# Patient Record
Sex: FEMALE | Race: WHITE
[De-identification: names, ages, dates, MRNs, and addresses within clinical notes are randomized per-mention and may not be internally consistent; named-entity substitution may affect disease eponyms.]

---

## 2019-02-14 PROBLEM — Z00.00 ENCOUNTER FOR PREVENTIVE HEALTH EXAMINATION: Status: ACTIVE | Noted: 2019-02-14

## 2019-03-07 ENCOUNTER — APPOINTMENT (OUTPATIENT)
Dept: ENDOCRINOLOGY | Facility: CLINIC | Age: 69
End: 2019-03-07
Payer: MEDICARE

## 2019-03-07 VITALS
BODY MASS INDEX: 31.08 KG/M2 | RESPIRATION RATE: 16 BRPM | HEART RATE: 96 BPM | WEIGHT: 198 LBS | OXYGEN SATURATION: 98 % | DIASTOLIC BLOOD PRESSURE: 80 MMHG | HEIGHT: 67 IN | SYSTOLIC BLOOD PRESSURE: 140 MMHG

## 2019-03-07 DIAGNOSIS — Z80.8 FAMILY HISTORY OF MALIGNANT NEOPLASM OF OTHER ORGANS OR SYSTEMS: ICD-10-CM

## 2019-03-07 PROCEDURE — 99214 OFFICE O/P EST MOD 30 MIN: CPT

## 2019-03-07 NOTE — HISTORY OF PRESENT ILLNESS
[FreeTextEntry1] : 67 yo female f/u for MNG/ hyperthyroidism\par feels well  on mmi 2.5mg qd . off inderal. \par no nausea on a smaller dose\par came earlier. increase cough on benicar/HCT 40/12.5\par saw Dr. Ferguson, reports nl work up. \par a1c- 6.0 <-- 6.0, TSH- 0.558 <-- 0.39, FT4- 1.03 <--  1.0, T3-138 <-- 116\par labs (4/24/18)- a1c- 6.0, TSH -0.4, FT4-0.94, T3-103, LDL-128, TC-202, 25D- 36\par \par Thyr US (11/27/18)- multiple b/l nodules, incl RLP 1.8, RMP 1.6, LMP 1.4. All described as stable\par Thyr US (5/1/18)- enlarged thyroid. multiple b/l nodulkes, incl dominant Rt inferior 1.8 complex w/ calc (prev FNA'd); LMP 2.0 complex with calc\par Thyr US (11/2/17)- multiple b/l nodules, incl Rt inferior 1.9cm complex and Lt superior 1.9 w/ calc. Appear stable\par Thyr US (5/1/17)- multiple b/l nodules, stable., incl dominant Rt inf complex 2.0cm; RMP calcified 1.0cm, LMP 1.9cm calcified\par Thyr US (9/20/16)- mutiple b/l nodules. Rt inferior 1.9cm (+ calcif), RMP calcified 1.0cm; LMP 1.8cm calcified. 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 RLP 2.0cm- AUS in a background of lymph thyroiditis (Katia III). \par \par ***Thyroseq- no mutations assoc w/ high risk cancer. TSHR mutation identified (10% freq)- this mutation is assos w/ low risk ca if  freq > 30%. therefore, likely to be a benign nodule\par \par Abd US (5/1/18)- several gallbladder polyps upto 0.4cm\par Abd US- 0.4cm gallbladder polyp\par \par *** intolerant of fosamax (myalgia)\par seeing Dr. Jones (rheum)- for poss prolia vs reclast arranged by him, but is presently on hold b/o dental implant work\par \par saw neuro (dr. Langston)- apparently nl w/up\par \par DXA (5/1/17)- LS (-2.1) with L1 (-2.5), Rt FN (-2.5). \par \par *** started fosamax in 11/17.\par \par DXA (5/19/15)- FN (-1.9),  LS (-1.7), wrist- (-1.5)\par FRAX - m/op- 11%  , hip- 1.8%\par \par \par 24h I-123 U+S (3/31/16)- 24.3%; dominant "cold" region in LMLP lobe. \par \par labs (3/29/16)- TSH- 0.10, FT4- 0.78, T3- 102, + Tg/TPO ab. neg TSI/TBII\par \par Thyr US (12/10/15)- multiple b/l nodules, incl LUP iso vascular 1.6x1.2x10, RMP iso vascular 1.6x1.5x0.9; RLP hypo, avascular 1.9x1.9x1.4\par \par \par \par HPI:\par with history of Hashimoto's thyroiditis, diagnosed in 2000. Initially on synthroid (seen by Dr. De La Vega) for 1-2 years, then switched to Dr. High, and eventually stopped synthroid because of low TSH. Has been followed annually for a "mildly low TSH", but did not require any intervention.\par \par Also, was diagnosed with MNG, recalls a benign FNA of one of the nodule several years ago.\par \par No US/ FNA reports are available.\par \par Reports having a DXA scan done last year, but is not aware of results.\par \par Sister was just diagnosed with a papillary thyroid cancer. Denies history of radiation exposure to head and neck area in a childhood. \par \par \par \par Labs (3/7/16)- TSH - 0.06, FT4- 1.07, T3- 111, a1c- 5.9\par \par labs from 12/15- TSH - 0.078, FT4- 1.09, a1c- 6.0\par \par

## 2019-03-07 NOTE — ASSESSMENT
[FreeTextEntry1] : Given osteopenia and incr risk for AFib, cont mmi 2.5mg qd (R+B in details).\par - monitor neutrophils\par D/w pt poss total tx - again reviewed risks of AUS and current thyroseq analysis report. Pt declines total tx for now\par d/c benicar/HCT. prev similar SE on cozaar, lisinopril\par - procardia xl 60mg. cont HCTZ 12.5mg\par keep off fosamax. to see rheum for prolia vs reclast (R+B)- per pt, is on hold for now b/o doing dental implants\par - given nodular stability , will rpt thyr US in 11/19. prev d/w pt potential reFNA  next year given + FHX and prior AUS pathology\par - reviewed need in statins- pt declined at present\par - advised to see GI  (Dr. Rocha) for gallbladder polyp\par - rpt DXA 3 sites in 05/19\par RTC 3 mos, or sooner prn. labs prior. [Carbohydrate Consistent Diet] : carbohydrate consistent diet [Methimazole Therapy] : Risks and benefits of methimazole therapy were discussed with the patient,  including rash, liver dysfunction, and agranulocytosis.  Patient was instructed to call the office for flu-like symptoms eg fever and sore-throat

## 2019-03-13 RX ORDER — NIFEDIPINE 60 MG/1
60 TABLET, EXTENDED RELEASE ORAL DAILY
Qty: 90 | Refills: 2 | Status: DISCONTINUED | COMMUNITY
Start: 2019-03-07 | End: 2019-03-13

## 2019-03-25 ENCOUNTER — RX RENEWAL (OUTPATIENT)
Age: 69
End: 2019-03-25

## 2019-05-13 ENCOUNTER — RX RENEWAL (OUTPATIENT)
Age: 69
End: 2019-05-13

## 2019-05-15 ENCOUNTER — RX RENEWAL (OUTPATIENT)
Age: 69
End: 2019-05-15

## 2019-06-02 LAB
25(OH)D3 SERPL-MCNC: 34.8 NG/ML
ALBUMIN SERPL ELPH-MCNC: 4.4 G/DL
ALP BLD-CCNC: 56 U/L
ALT SERPL-CCNC: 21 U/L
ANION GAP SERPL CALC-SCNC: 15 MMOL/L
AST SERPL-CCNC: 22 U/L
BILIRUB SERPL-MCNC: 0.6 MG/DL
BUN SERPL-MCNC: 19 MG/DL
CALCIUM SERPL-MCNC: 9.3 MG/DL
CHLORIDE SERPL-SCNC: 105 MMOL/L
CHOLEST SERPL-MCNC: 183 MG/DL
CHOLEST/HDLC SERPL: 3 RATIO
CO2 SERPL-SCNC: 24 MMOL/L
CREAT SERPL-MCNC: 0.92 MG/DL
ESTIMATED AVERAGE GLUCOSE: 128 MG/DL
GLUCOSE SERPL-MCNC: 102 MG/DL
HBA1C MFR BLD HPLC: 6.1 %
HDLC SERPL-MCNC: 62 MG/DL
LDLC SERPL CALC-MCNC: 105 MG/DL
POTASSIUM SERPL-SCNC: 4.6 MMOL/L
PROT SERPL-MCNC: 6.7 G/DL
SODIUM SERPL-SCNC: 144 MMOL/L
T3 SERPL-MCNC: 155 NG/DL
T4 FREE SERPL-MCNC: 1.3 NG/DL
TRIGL SERPL-MCNC: 82 MG/DL
TSH SERPL-ACNC: 0.1 UIU/ML

## 2019-06-06 ENCOUNTER — APPOINTMENT (OUTPATIENT)
Dept: ENDOCRINOLOGY | Facility: CLINIC | Age: 69
End: 2019-06-06
Payer: MEDICARE

## 2019-06-06 ENCOUNTER — TRANSCRIPTION ENCOUNTER (OUTPATIENT)
Age: 69
End: 2019-06-06

## 2019-06-06 VITALS — SYSTOLIC BLOOD PRESSURE: 130 MMHG | DIASTOLIC BLOOD PRESSURE: 80 MMHG

## 2019-06-06 LAB — CYTOLOGY CVX/VAG DOC THIN PREP: NORMAL

## 2019-06-06 PROCEDURE — 99214 OFFICE O/P EST MOD 30 MIN: CPT

## 2019-06-06 RX ORDER — ERGOCALCIFEROL 1.25 MG/1
1.25 MG CAPSULE ORAL
Qty: 13 | Refills: 1 | Status: ACTIVE | COMMUNITY
Start: 2019-03-25 | End: 1900-01-01

## 2019-06-06 NOTE — HISTORY OF PRESENT ILLNESS
[FreeTextEntry1] : 69 yo female f/u for MNG/ hyperthyroidism\par feels well  on mmi 2.5mg qd, but skipped . off inderal. \par taking HCTZ prn only. using valerian root with BP improvement\par *** weight gain/gluid retention on procardia\par *** short-term memory loss on toprol\par ***  cough on benicar/HCT\par TSH-0.1 (missed meds), FT4- 1.3, T3- 155\par \par no nausea on a smaller dose\par \par saw Dr. Ferguson, reports nl work up. \par a1c- 6.1 <--  6.0 <-- 6.0, \par TSH- 0.558 <-- 0.39, FT4- 1.03 <--  1.0, T3-138 <-- 116\par labs (4/24/18)- a1c- 6.0, TSH -0.4, FT4-0.94, T3-103, LDL-128, TC-202, 25D- 36\par \par Thyr US (11/27/18)- multiple b/l nodules, incl RLP 1.8, RMP 1.6, LMP 1.4. All described as stable\par Thyr US (5/1/18)- enlarged thyroid. multiple b/l nodulkes, incl dominant Rt inferior 1.8 complex w/ calc (prev FNA'd); LMP 2.0 complex with calc\par Thyr US (11/2/17)- multiple b/l nodules, incl Rt inferior 1.9cm complex and Lt superior 1.9 w/ calc. Appear stable\par Thyr US (5/1/17)- multiple b/l nodules, stable., incl dominant Rt inf complex 2.0cm; RMP calcified 1.0cm, LMP 1.9cm calcified\par Thyr US (9/20/16)- mutiple b/l nodules. Rt inferior 1.9cm (+ calcif), RMP calcified 1.0cm; LMP 1.8cm calcified. 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 RLP 2.0cm- AUS in a background of lymph thyroiditis (Katia III). \par \par ***Thyroseq- no mutations assoc w/ high risk cancer. TSHR mutation identified (10% freq)- this mutation is assos w/ low risk ca if  freq > 30%. therefore, likely to be a benign nodule\par \par Abd US (5/1/18)- several gallbladder polyps upto 0.4cm\par Abd US- 0.4cm gallbladder polyp\par \par *** intolerant of fosamax (myalgia)\par seeing Dr. Jones (rheum)- for poss prolia vs reclast arranged by him, but is presently on hold b/o dental implant work\par \par saw neuro (dr. Langston)- apparently nl w/up\par \par DXA (5/16/19)- LS (-1.7) with OA changes in L1 (-1.3); L2-L4 (-1.8), RFN (-2.4), radius 33% (-2.1).  FRAX - 13% and 3.1%\par DXA (5/1/17)- LS (-2.1) with L1 (-2.5), Rt FN (-2.5). \par \par *** started fosamax in 11/17, stopped  after few doses\par \par DXA (5/19/15)- FN (-1.9),  LS (-1.7), wrist- (-1.5)\par FRAX - m/op- 11% , hip- 1.8%\par \par \par 24h I-123 U+S (3/31/16)- 24.3%; dominant "cold" region in LMLP lobe. \par \par labs (3/29/16)- TSH- 0.10, FT4- 0.78, T3- 102, + Tg/TPO ab. neg TSI/TBII\par \par Thyr US (12/10/15)- multiple b/l nodules, incl LUP iso vascular 1.6x1.2x10, RMP iso vascular 1.6x1.5x0.9; RLP hypo, avascular 1.9x1.9x1.4\par \par \par \par HPI:\par with history of Hashimoto's thyroiditis, diagnosed in 2000. Initially on synthroid (seen by Dr. De La Vega) for 1-2 years, then switched to Dr. High, and eventually stopped synthroid because of low TSH. Has been followed annually for a "mildly low TSH", but did not require any intervention.\par \par Also, was diagnosed with MNG, recalls a benign FNA of one of the nodule several years ago.\par \par No US/ FNA reports are available.\par \par Reports having a DXA scan done last year, but is not aware of results.\par \par Sister was just diagnosed with a papillary thyroid cancer. Denies history of radiation exposure to head and neck area in a childhood. \par \par Labs (3/7/16)- TSH - 0.06, FT4- 1.07, T3- 111, a1c- 5.9\par labs from 12/15- TSH - 0.078, FT4- 1.09, a1c- 6.0\par \par

## 2019-06-06 NOTE — ASSESSMENT
[Carbohydrate Consistent Diet] : carbohydrate consistent diet [Methimazole Therapy] : Risks and benefits of methimazole therapy were discussed with the patient,  including rash, liver dysfunction, and agranulocytosis.  Patient was instructed to call the office for flu-like symptoms eg fever and sore-throat [FreeTextEntry1] : Given osteopenia and incr risk for AFib, resume mmi 2.5mg qd (R+B in details).\par - monitor neutrophils\par D/w pt poss total tx - again reviewed risks of AUS and current thyroseq analysis report. Pt declines total tx for now\par - resume HCTZ 12.5mg\par keep off fosamax. to see rheum for prolia vs reclast (R+B)- per pt, is on hold for now b/o doing dental implants\par - given nodular stability , will rpt thyr US in 11/19. prev d/w pt potential reFNA  next year given + FHX and prior AUS pathology\par - reviewed need in statins- pt declined at present\par - advised to see GI  (Dr. Rocha) for gallbladder polyp\par - rpt DXA 3 sites in 05/21. advised on medical tx given elev FRAX score- she's reluctant at present\par RTC 3 mos, or sooner prn. labs prior.

## 2019-07-31 ENCOUNTER — RX RENEWAL (OUTPATIENT)
Age: 69
End: 2019-07-31

## 2019-09-06 ENCOUNTER — LABORATORY RESULT (OUTPATIENT)
Age: 69
End: 2019-09-06

## 2019-09-11 ENCOUNTER — APPOINTMENT (OUTPATIENT)
Dept: ENDOCRINOLOGY | Facility: CLINIC | Age: 69
End: 2019-09-11
Payer: MEDICARE

## 2019-09-11 VITALS
HEART RATE: 77 BPM | DIASTOLIC BLOOD PRESSURE: 80 MMHG | TEMPERATURE: 97.7 F | WEIGHT: 193 LBS | BODY MASS INDEX: 30.29 KG/M2 | HEIGHT: 67 IN | OXYGEN SATURATION: 97 % | SYSTOLIC BLOOD PRESSURE: 128 MMHG

## 2019-09-11 PROCEDURE — 99214 OFFICE O/P EST MOD 30 MIN: CPT

## 2019-09-11 NOTE — ASSESSMENT
[Carbohydrate Consistent Diet] : carbohydrate consistent diet [Methimazole Therapy] : Risks and benefits of methimazole therapy were discussed with the patient,  including rash, liver dysfunction, and agranulocytosis.  Patient was instructed to call the office for flu-like symptoms eg fever and sore-throat [FreeTextEntry1] : Given osteopenia and incr risk for AFib, advised to slowly uptitrate mmi 2.5 to 5 mg qod\par - monitor neutrophils\par D/w pt poss total tx - again reviewed risks of AUS and current thyroseq analysis report. Pt declines total tx for now\par - close BP monitoring, resume HCTZ 12.5mg if needed\par keep off fosamax. to see rheum for prolia vs reclast (R+B)- per pt, is on hold for now b/o doing dental implants\par - given nodular stability , will rpt thyr US in 11/19. prev d/w pt potential reFNA  next year given + FHX and prior AUS pathology\par - reviewed need in statins- pt declined at present\par - advised to see GI  (Dr. Rocha) for gallbladder polyp\par - rpt DXA 3 sites in 05/21. advised on medical tx given elev FRAX score- she's reluctant at present\par RTC 3 mos, or sooner prn. labs prior.

## 2019-09-11 NOTE — HISTORY OF PRESENT ILLNESS
[FreeTextEntry1] : 70 yo female f/u for MNG/ hyperthyroidism\par \par *** Sep 11, 2019 ***\par stopped BP meds b/o BP is fine. Taking valerian root, less stres\par LDL- 111, fasting glu- 113\par TSH- 0.11\par FT4- 1.1, T3- 124\par a1c- 6.0 <-- 6.1\par \par feels well  on mmi 2.5mg qd . off inderal. \par \par *** June 06, 2019 ***\par \par taking HCTZ prn only. using valerian root with BP improvement\par *** weight gain/gluid retention on procardia\par *** short-term memory loss on toprol\par ***  cough on benicar/HCT\par TSH-0.1 (missed meds), FT4- 1.3, T3- 155\par \par no nausea on a smaller dose\par \par saw Dr. Ferguson, reports nl work up. \par a1c- 6.1 <--  6.0 <-- 6.0, \par TSH- 0.558 <-- 0.39, FT4- 1.03 <--  1.0, T3-138 <-- 116\par labs (4/24/18)- a1c- 6.0, TSH -0.4, FT4-0.94, T3-103, LDL-128, TC-202, 25D- 36\par \par Thyr US (11/27/18)- multiple b/l nodules, incl RLP 1.8, RMP 1.6, LMP 1.4. All described as stable\par Thyr US (5/1/18)- enlarged thyroid. multiple b/l nodulkes, incl dominant Rt inferior 1.8 complex w/ calc (prev FNA'd); LMP 2.0 complex with calc\par Thyr US (11/2/17)- multiple b/l nodules, incl Rt inferior 1.9cm complex and Lt superior 1.9 w/ calc. Appear stable\par Thyr US (5/1/17)- multiple b/l nodules, stable., incl dominant Rt inf complex 2.0cm; RMP calcified 1.0cm, LMP 1.9cm calcified\par Thyr US (9/20/16)- mutiple b/l nodules. Rt inferior 1.9cm (+ calcif), RMP calcified 1.0cm; LMP 1.8cm calcified. 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 RLP 2.0cm- AUS in a background of lymph thyroiditis (Katia III). \par \par ***Thyroseq- no mutations assoc w/ high risk cancer. TSHR mutation identified (10% freq)- this mutation is assos w/ low risk ca if  freq > 30%. therefore, likely to be a benign nodule\par \par Abd US (5/1/18)- several gallbladder polyps upto 0.4cm\par Abd US- 0.4cm gallbladder polyp\par \par *** intolerant of fosamax (myalgia)\par seeing Dr. Jones (rheum)- for poss prolia vs reclast arranged by him, but is presently on hold b/o dental implant work\par \par saw neuro (dr. Langston)- apparently nl w/up\par \par DXA (5/16/19)- LS (-1.7) with OA changes in L1 (-1.3); L2-L4 (-1.8), RFN (-2.4), radius 33% (-2.1).  FRAX - 13% and 3.1%\par DXA (5/1/17)- LS (-2.1) with L1 (-2.5), Rt FN (-2.5). \par \par *** started fosamax in 11/17, stopped  after few doses\par \par DXA (5/19/15)- FN (-1.9),  LS (-1.7), wrist- (-1.5)\par FRAX - m/op- 11% , hip- 1.8%\par \par \par 24h I-123 U+S (3/31/16)- 24.3%; dominant "cold" region in LMLP lobe. \par \par labs (3/29/16)- TSH- 0.10, FT4- 0.78, T3- 102, + Tg/TPO ab. neg TSI/TBII\par \par Thyr US (12/10/15)- multiple b/l nodules, incl LUP iso vascular 1.6x1.2x10, RMP iso vascular 1.6x1.5x0.9; RLP hypo, avascular 1.9x1.9x1.4\par \par \par \par HPI:\par with history of Hashimoto's thyroiditis, diagnosed in 2000. Initially on synthroid (seen by Dr. De La Vega) for 1-2 years, then switched to Dr. High, and eventually stopped synthroid because of low TSH. Has been followed annually for a "mildly low TSH", but did not require any intervention.\par \par Also, was diagnosed with MNG, recalls a benign FNA of one of the nodule several years ago.\par \par No US/ FNA reports are available.\par \par Reports having a DXA scan done last year, but is not aware of results.\par \par Sister was just diagnosed with a papillary thyroid cancer. Denies history of radiation exposure to head and neck area in a childhood. \par \par Labs (3/7/16)- TSH - 0.06, FT4- 1.07, T3- 111, a1c- 5.9\par labs from 12/15- TSH - 0.078, FT4- 1.09, a1c- 6.0\par \par

## 2019-12-04 LAB
25(OH)D3 SERPL-MCNC: 35.8 NG/ML
ALBUMIN SERPL ELPH-MCNC: 4.3 G/DL
ALP BLD-CCNC: 60 U/L
ALT SERPL-CCNC: 21 U/L
ANION GAP SERPL CALC-SCNC: 14 MMOL/L
AST SERPL-CCNC: 21 U/L
BILIRUB SERPL-MCNC: 0.4 MG/DL
BUN SERPL-MCNC: 18 MG/DL
CALCIUM SERPL-MCNC: 9.5 MG/DL
CHLORIDE SERPL-SCNC: 104 MMOL/L
CHOLEST SERPL-MCNC: 195 MG/DL
CHOLEST/HDLC SERPL: 2.9 RATIO
CO2 SERPL-SCNC: 24 MMOL/L
CREAT SERPL-MCNC: 0.97 MG/DL
ESTIMATED AVERAGE GLUCOSE: 123 MG/DL
GLUCOSE SERPL-MCNC: 113 MG/DL
HBA1C MFR BLD HPLC: 5.9 %
HDLC SERPL-MCNC: 68 MG/DL
LDLC SERPL CALC-MCNC: 110 MG/DL
POTASSIUM SERPL-SCNC: 4.8 MMOL/L
PROT SERPL-MCNC: 6.7 G/DL
SODIUM SERPL-SCNC: 142 MMOL/L
T3 SERPL-MCNC: 133 NG/DL
T4 FREE SERPL-MCNC: 0.9 NG/DL
TRIGL SERPL-MCNC: 87 MG/DL
TSH SERPL-ACNC: 0.2 UIU/ML

## 2019-12-11 ENCOUNTER — APPOINTMENT (OUTPATIENT)
Dept: ENDOCRINOLOGY | Facility: CLINIC | Age: 69
End: 2019-12-11
Payer: MEDICARE

## 2019-12-11 VITALS
SYSTOLIC BLOOD PRESSURE: 130 MMHG | OXYGEN SATURATION: 98 % | HEART RATE: 82 BPM | WEIGHT: 193 LBS | HEIGHT: 67 IN | RESPIRATION RATE: 16 BRPM | BODY MASS INDEX: 30.29 KG/M2 | DIASTOLIC BLOOD PRESSURE: 80 MMHG

## 2019-12-11 PROCEDURE — 99214 OFFICE O/P EST MOD 30 MIN: CPT

## 2019-12-11 NOTE — ASSESSMENT
[Methimazole Therapy] : Risks and benefits of methimazole therapy were discussed with the patient,  including rash, liver dysfunction, and agranulocytosis.  Patient was instructed to call the office for flu-like symptoms eg fever and sore-throat [Carbohydrate Consistent Diet] : carbohydrate consistent diet [FreeTextEntry1] : Given osteopenia and incr risk for AFib, cont with mmi 2.5 to 5 mg qod\par - monitor neutrophils\par - prev d/w pt poss total tx - again reviewed risks of AUS and current thyroseq analysis report. Pt declines total tx for now\par - close BP monitoring, cont benicar\par keep off fosamax. to see rheum for prolia vs reclast (R+B)- per pt, is on hold for now b/o doing dental implants\par - given nodular stability , will rpt thyr US in 11/19. prev d/w pt potential reFNA  next year given + FHX and prior AUS pathology\par - again, reviewed need in statins- pt declined at present\par - advised to see GI  (Dr. Rocha) for gallbladder polyp\par - rpt DXA 3 sites in 05/21. advised on medical tx given elev FRAX score- she's reluctant at present\par RTC 3-4 mos, or sooner prn. labs prior.

## 2019-12-11 NOTE — HISTORY OF PRESENT ILLNESS
[FreeTextEntry1] : 68 yo female f/u for MNG/ hyperthyroidism\par \par *** Dec 11, 2019 ***\par \par on mmi alternating 2.5mg and 5 mg\par TSH- 0.2, FT4- 0.9\par a1c- 5.9\par \par Thyr US (12/3/19)- stable multiple b/l nodules, incl RLP 1.9, RMP- 1.7, LLP 1.6\par \par \par *** Sep 11, 2019 ***\par stopped BP meds b/o BP is fine. Taking valerian root, less stres\par LDL- 111, fasting glu- 113\par TSH- 0.11\par FT4- 1.1, T3- 124\par a1c- 6.0 <-- 6.1\par \par feels well  on mmi 2.5mg qd . off inderal. \par \par *** June 06, 2019 ***\par \par taking HCTZ prn only. using valerian root with BP improvement\par *** weight gain/gluid retention on procardia\par *** short-term memory loss on toprol\par ***  cough on benicar/HCT\par TSH-0.1 (missed meds), FT4- 1.3, T3- 155\par \par no nausea on a smaller dose\par \par saw Dr. Ferguson, reports nl work up. \par a1c- 6.1 <--  6.0 <-- 6.0, \par TSH- 0.558 <-- 0.39, FT4- 1.03 <--  1.0, T3-138 <-- 116\par labs (4/24/18)- a1c- 6.0, TSH -0.4, FT4-0.94, T3-103, LDL-128, TC-202, 25D- 36\par \par Thyr US (11/27/18)- multiple b/l nodules, incl RLP 1.8, RMP 1.6, LMP 1.4. All described as stable\par Thyr US (5/1/18)- enlarged thyroid. multiple b/l nodulkes, incl dominant Rt inferior 1.8 complex w/ calc (prev FNA'd); LMP 2.0 complex with calc\par Thyr US (11/2/17)- multiple b/l nodules, incl Rt inferior 1.9cm complex and Lt superior 1.9 w/ calc. Appear stable\par Thyr US (5/1/17)- multiple b/l nodules, stable., incl dominant Rt inf complex 2.0cm; RMP calcified 1.0cm, LMP 1.9cm calcified\par Thyr US (9/20/16)- mutiple b/l nodules. Rt inferior 1.9cm (+ calcif), RMP calcified 1.0cm; LMP 1.8cm calcified. 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 RLP 2.0cm- AUS in a background of lymph thyroiditis (Katia III). \par \par ***Thyroseq- no mutations assoc w/ high risk cancer. TSHR mutation identified (10% freq)- this mutation is assos w/ low risk ca if  freq > 30%. therefore, likely to be a benign nodule\par \par Abd US (5/1/18)- several gallbladder polyps upto 0.4cm\par Abd US- 0.4cm gallbladder polyp\par \par *** intolerant of fosamax (myalgia)\par seeing Dr. Jones (rheum)- for poss prolia vs reclast arranged by him, but is presently on hold b/o dental implant work\par \par saw neuro (dr. Langston)- apparently nl w/up\par \par DXA (5/16/19)- LS (-1.7) with OA changes in L1 (-1.3); L2-L4 (-1.8), RFN (-2.4), radius 33% (-2.1).  FRAX - 13% and 3.1%\par DXA (5/1/17)- LS (-2.1) with L1 (-2.5), Rt FN (-2.5). \par \par *** started fosamax in 11/17, stopped  after few doses\par \par DXA (5/19/15)- FN (-1.9),  LS (-1.7), wrist- (-1.5)\par FRAX - m/op- 11% , hip- 1.8%\par \par \par 24h I-123 U+S (3/31/16)- 24.3%; dominant "cold" region in LMLP lobe. \par \par labs (3/29/16)- TSH- 0.10, FT4- 0.78, T3- 102, + Tg/TPO ab. neg TSI/TBII\par \par Thyr US (12/10/15)- multiple b/l nodules, incl LUP iso vascular 1.6x1.2x10, RMP iso vascular 1.6x1.5x0.9; RLP hypo, avascular 1.9x1.9x1.4\par \par \par \par HPI:\par with history of Hashimoto's thyroiditis, diagnosed in 2000. Initially on synthroid (seen by Dr. De La Vega) for 1-2 years, then switched to Dr. High, and eventually stopped synthroid because of low TSH. Has been followed annually for a "mildly low TSH", but did not require any intervention.\par \par Also, was diagnosed with MNG, recalls a benign FNA of one of the nodule several years ago.\par \par No US/ FNA reports are available.\par \par Reports having a DXA scan done last year, but is not aware of results.\par \par Sister was just diagnosed with a papillary thyroid cancer. Denies history of radiation exposure to head and neck area in a childhood. \par \par Labs (3/7/16)- TSH - 0.06, FT4- 1.07, T3- 111, a1c- 5.9\par labs from 12/15- TSH - 0.078, FT4- 1.09, a1c- 6.0\par \par

## 2020-01-03 ENCOUNTER — RX RENEWAL (OUTPATIENT)
Age: 70
End: 2020-01-03

## 2020-04-29 ENCOUNTER — APPOINTMENT (OUTPATIENT)
Dept: ENDOCRINOLOGY | Facility: CLINIC | Age: 70
End: 2020-04-29

## 2020-06-09 ENCOUNTER — LABORATORY RESULT (OUTPATIENT)
Age: 70
End: 2020-06-09

## 2020-06-16 ENCOUNTER — APPOINTMENT (OUTPATIENT)
Dept: ENDOCRINOLOGY | Facility: CLINIC | Age: 70
End: 2020-06-16
Payer: MEDICARE

## 2020-06-16 VITALS
OXYGEN SATURATION: 98 % | DIASTOLIC BLOOD PRESSURE: 80 MMHG | BODY MASS INDEX: 29.66 KG/M2 | HEIGHT: 67 IN | HEART RATE: 83 BPM | WEIGHT: 189 LBS | RESPIRATION RATE: 16 BRPM | SYSTOLIC BLOOD PRESSURE: 120 MMHG

## 2020-06-16 PROCEDURE — 99214 OFFICE O/P EST MOD 30 MIN: CPT | Mod: 25

## 2020-06-16 PROCEDURE — 36415 COLL VENOUS BLD VENIPUNCTURE: CPT

## 2020-06-16 NOTE — REASON FOR VISIT
[Follow - Up] : a follow-up visit [Hyperthyroidism] : hyperthyroidism [Thyroid nodule/ MNG] : thyroid nodule/ MNG

## 2020-06-16 NOTE — HISTORY OF PRESENT ILLNESS
[FreeTextEntry1] : 69 yo female f/u for MNG/ hyperthyroidism\par \par *** Jun 16, 2020 ***\par \par lost 20 lbs on the diet. feels well overall \par taking mmi 2.5 mg qd \par Thyr US (12/3/19)- stable b/l multiple thyroid nodules\par \par *** Dec 11, 2019 ***\par \par on mmi alternating 2.5mg and 5 mg\par TSH- 0.2, FT4- 0.9\par a1c- 5.9\par \par Thyr US (12/3/19)- stable multiple b/l nodules, incl RLP 1.9, RMP- 1.7, LLP 1.6\par \par \par *** Sep 11, 2019 ***\par stopped BP meds b/o BP is fine. Taking valerian root, less stres\par LDL- 111, fasting glu- 113\par TSH- 0.11\par FT4- 1.1, T3- 124\par a1c- 6.0 <-- 6.1\par \par feels well  on mmi 2.5mg qd . off inderal. \par \par *** June 06, 2019 ***\par \par taking HCTZ prn only. using valerian root with BP improvement\par *** weight gain/gluid retention on procardia\par *** short-term memory loss on toprol\par ***  cough on benicar/HCT\par TSH-0.1 (missed meds), FT4- 1.3, T3- 155\par \par no nausea on a smaller dose\par \par saw Dr. Ferguson, reports nl work up. \par a1c- 6.1 <--  6.0 <-- 6.0, \par TSH- 0.558 <-- 0.39, FT4- 1.03 <--  1.0, T3-138 <-- 116\par labs (4/24/18)- a1c- 6.0, TSH -0.4, FT4-0.94, T3-103, LDL-128, TC-202, 25D- 36\par \par Thyr US (11/27/18)- multiple b/l nodules, incl RLP 1.8, RMP 1.6, LMP 1.4. All described as stable\par Thyr US (5/1/18)- enlarged thyroid. multiple b/l nodulkes, incl dominant Rt inferior 1.8 complex w/ calc (prev FNA'd); LMP 2.0 complex with calc\par Thyr US (11/2/17)- multiple b/l nodules, incl Rt inferior 1.9cm complex and Lt superior 1.9 w/ calc. Appear stable\par Thyr US (5/1/17)- multiple b/l nodules, stable., incl dominant Rt inf complex 2.0cm; RMP calcified 1.0cm, LMP 1.9cm calcified\par Thyr US (9/20/16)- mutiple b/l nodules. Rt inferior 1.9cm (+ calcif), RMP calcified 1.0cm; LMP 1.8cm calcified. 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 RLP 2.0cm- AUS in a background of lymph thyroiditis (Katia III). \par \par ***Thyroseq- no mutations assoc w/ high risk cancer. TSHR mutation identified (10% freq)- this mutation is assos w/ low risk ca if  freq > 30%. therefore, likely to be a benign nodule\par \par Abd US (5/1/18)- several gallbladder polyps upto 0.4cm\par Abd US- 0.4cm gallbladder polyp\par \par *** intolerant of fosamax (myalgia)\par seeing Dr. Jones (rheum)- for poss prolia vs reclast arranged by him, but is presently on hold b/o dental implant work\par \par saw neuro (dr. Langston)- apparently nl w/up\par \par DXA (5/16/19)- LS (-1.7) with OA changes in L1 (-1.3); L2-L4 (-1.8), RFN (-2.4), radius 33% (-2.1).  FRAX - 13% and 3.1%\par DXA (5/1/17)- LS (-2.1) with L1 (-2.5), Rt FN (-2.5). \par \par *** started fosamax in 11/17, stopped  after few doses\par \par DXA (5/19/15)- FN (-1.9),  LS (-1.7), wrist- (-1.5)\par FRAX - m/op- 11% , hip- 1.8%\par \par \par 24h I-123 U+S (3/31/16)- 24.3%; dominant "cold" region in LMLP lobe. \par \par labs (3/29/16)- TSH- 0.10, FT4- 0.78, T3- 102, + Tg/TPO ab. neg TSI/TBII\par \par Thyr US (12/10/15)- multiple b/l nodules, incl LUP iso vascular 1.6x1.2x10, RMP iso vascular 1.6x1.5x0.9; RLP hypo, avascular 1.9x1.9x1.4\par \par \par \par HPI:\par with history of Hashimoto's thyroiditis, diagnosed in 2000. Initially on synthroid (seen by Dr. De La Vega) for 1-2 years, then switched to Dr. High, and eventually stopped synthroid because of low TSH. Has been followed annually for a "mildly low TSH", but did not require any intervention.\par \par Also, was diagnosed with MNG, recalls a benign FNA of one of the nodule several years ago.\par \par No US/ FNA reports are available.\par \par Reports having a DXA scan done last year, but is not aware of results.\par \par Sister was just diagnosed with a papillary thyroid cancer. Denies history of radiation exposure to head and neck area in a childhood. \par \par Labs (3/7/16)- TSH - 0.06, FT4- 1.07, T3- 111, a1c- 5.9\par labs from 12/15- TSH - 0.078, FT4- 1.09, a1c- 6.0\par \par

## 2020-06-16 NOTE — ASSESSMENT
[Carbohydrate Consistent Diet] : carbohydrate consistent diet [Methimazole Therapy] : Risks and benefits of methimazole therapy were discussed with the patient,  including rash, liver dysfunction, and agranulocytosis.  Patient was instructed to call the office for flu-like symptoms eg fever and sore-throat [FreeTextEntry1] : Given osteopenia and incr risk for AFib, cont with mmi \par - incr 2.5 mg 5/wk +  5mg 2/wk\par - monitor neutrophils\par - prev d/w pt poss total tx - again reviewed risks of AUS and current thyroseq analysis report. Pt declines total tx for now\par - close BP monitoring, cont benicar\par keep off fosamax. to see rheum for prolia vs reclast (R+B)- per pt, is on hold for now b/o doing dental implants\par - given nodular stability , will rpt thyr US in 12/20. prev d/w pt potential reFNA  next year given + FHX and prior AUS pathology\par - again, reviewed need in statins- pt declined at present\par - advised to see GI  (Dr. Rocha) for gallbladder polyp\par - rpt DXA 3 sites in 05/21. advised on medical tx given elev FRAX score- she's reluctant at present\par RTC 3-4 mos, or sooner prn. labs prior.

## 2020-06-17 LAB
MAGNESIUM SERPL-MCNC: 1.9 MG/DL
SARS-COV-2 IGG SERPL IA-ACNC: <0.1 INDEX
SARS-COV-2 IGG SERPL QL IA: NEGATIVE

## 2020-11-16 LAB
25(OH)D3 SERPL-MCNC: 42.6 NG/ML
ALBUMIN SERPL ELPH-MCNC: 4.4 G/DL
ALP BLD-CCNC: 68 U/L
ALT SERPL-CCNC: 25 U/L
ANION GAP SERPL CALC-SCNC: 9 MMOL/L
AST SERPL-CCNC: 22 U/L
BASOPHILS # BLD AUTO: 0.03 K/UL
BASOPHILS NFR BLD AUTO: 0.5 %
BILIRUB SERPL-MCNC: 0.5 MG/DL
BUN SERPL-MCNC: 19 MG/DL
CALCIUM SERPL-MCNC: 9.3 MG/DL
CHLORIDE SERPL-SCNC: 105 MMOL/L
CHOLEST SERPL-MCNC: 204 MG/DL
CO2 SERPL-SCNC: 27 MMOL/L
CREAT SERPL-MCNC: 0.91 MG/DL
DHEA-S SERPL-MCNC: 61.9 UG/DL
EOSINOPHIL # BLD AUTO: 0.07 K/UL
EOSINOPHIL NFR BLD AUTO: 1.2 %
ESTIMATED AVERAGE GLUCOSE: 123 MG/DL
FOLATE SERPL-MCNC: >20 NG/ML
GLUCOSE SERPL-MCNC: 111 MG/DL
HBA1C MFR BLD HPLC: 5.9 %
HCT VFR BLD CALC: 39.8 %
HDLC SERPL-MCNC: 69 MG/DL
HGB BLD-MCNC: 12.7 G/DL
IMM GRANULOCYTES NFR BLD AUTO: 0.2 %
LDLC SERPL CALC-MCNC: 119 MG/DL
LYMPHOCYTES # BLD AUTO: 2.7 K/UL
LYMPHOCYTES NFR BLD AUTO: 45.2 %
MAN DIFF?: NORMAL
MCHC RBC-ENTMCNC: 30.2 PG
MCHC RBC-ENTMCNC: 31.9 GM/DL
MCV RBC AUTO: 94.5 FL
MONOCYTES # BLD AUTO: 0.49 K/UL
MONOCYTES NFR BLD AUTO: 8.2 %
NEUTROPHILS # BLD AUTO: 2.68 K/UL
NEUTROPHILS NFR BLD AUTO: 44.7 %
NONHDLC SERPL-MCNC: 134 MG/DL
PLATELET # BLD AUTO: 195 K/UL
POTASSIUM SERPL-SCNC: 4.8 MMOL/L
PROT SERPL-MCNC: 6.6 G/DL
RBC # BLD: 4.21 M/UL
RBC # FLD: 13.8 %
SODIUM SERPL-SCNC: 141 MMOL/L
T3 SERPL-MCNC: 129 NG/DL
T4 FREE SERPL-MCNC: 1 NG/DL
TRIGL SERPL-MCNC: 77 MG/DL
TSH SERPL-ACNC: 0.34 UIU/ML
VIT B12 SERPL-MCNC: 659 PG/ML
WBC # FLD AUTO: 5.98 K/UL

## 2020-11-18 LAB — SHBG SERPL-SCNC: 49 NMOL/L

## 2020-11-20 LAB — VIT B1 SERPL-MCNC: 150.1 NMOL/L

## 2020-11-23 ENCOUNTER — APPOINTMENT (OUTPATIENT)
Dept: ENDOCRINOLOGY | Facility: CLINIC | Age: 70
End: 2020-11-23
Payer: MEDICARE

## 2020-11-23 VITALS
DIASTOLIC BLOOD PRESSURE: 80 MMHG | HEIGHT: 67 IN | SYSTOLIC BLOOD PRESSURE: 140 MMHG | TEMPERATURE: 97.5 F | WEIGHT: 193 LBS | RESPIRATION RATE: 16 BRPM | OXYGEN SATURATION: 98 % | BODY MASS INDEX: 30.29 KG/M2 | HEART RATE: 74 BPM

## 2020-11-23 DIAGNOSIS — Z11.59 ENCOUNTER FOR SCREENING FOR OTHER VIRAL DISEASES: ICD-10-CM

## 2020-11-23 PROCEDURE — 99215 OFFICE O/P EST HI 40 MIN: CPT | Mod: CS

## 2020-11-23 NOTE — ASSESSMENT
[Carbohydrate Consistent Diet] : carbohydrate consistent diet [Methimazole Therapy] : Risks and benefits of methimazole therapy were discussed with the patient,  including rash, liver dysfunction, and agranulocytosis.  Patient was instructed to call the office for flu-like symptoms eg fever and sore-throat [FreeTextEntry1] : Given osteopenia and incr risk for AFib\par - cont with mmi 2.5 mg qd\par - monitor neutrophils\par - prev d/w pt poss total tx - again reviewed risks of AUS and current thyroseq analysis report. Pt declines total tx for now\par - close BP monitoring, cont benicar\par keep off fosamax. to see rheum for prolia vs reclast (R+B)- per pt, is on hold for now b/o doing dental implants\par - given nodular stability , will rpt thyr US in 12/20. prev d/w pt potential reFNA  next year given + FHX and prior AUS pathology\par - repeat testo, 17ohP/Preg, ASTD\par - pelvic/TV sono\par - again, reviewed need in statins- pt declined at present\par - advised to see GI  (Dr. Rocha) for gallbladder polyp\par - rpt DXA 3 sites in 05/21. advised on medical tx given elev FRAX score- she's reluctant at present\par RTC 3-4 mos, or sooner prn. labs prior.

## 2020-11-23 NOTE — HISTORY OF PRESENT ILLNESS
[FreeTextEntry1] : 71 yo female f/u for MNG/ hyperthyroidism\par \par *** Nov 23, 2020 ***\par \par feels well overall. less hair loss- used PRP therapy\par on mmi 2.5 mg qd\par \par *** Jun 16, 2020 ***\par \par lost 20 lbs on the diet. feels well overall \par taking mmi 2.5 mg qd \par Thyr US (12/3/19)- stable b/l multiple thyroid nodules\par \par *** Dec 11, 2019 ***\par \par on mmi alternating 2.5mg and 5 mg\par TSH- 0.2, FT4- 0.9\par a1c- 5.9\par \par Thyr US (12/3/19)- stable multiple b/l nodules, incl RLP 1.9, RMP- 1.7, LLP 1.6\par \par \par *** Sep 11, 2019 ***\par stopped BP meds b/o BP is fine. Taking valerian root, less stres\par LDL- 111, fasting glu- 113\par TSH- 0.11\par FT4- 1.1, T3- 124\par a1c- 6.0 <-- 6.1\par \par feels well  on mmi 2.5mg qd . off inderal. \par \par *** June 06, 2019 ***\par \par taking HCTZ prn only. using valerian root with BP improvement\par *** weight gain/gluid retention on procardia\par *** short-term memory loss on toprol\par ***  cough on benicar/HCT\par TSH-0.1 (missed meds), FT4- 1.3, T3- 155\par \par no nausea on a smaller dose\par \par saw Dr. Ferguson, reports nl work up. \par a1c- 6.1 <--  6.0 <-- 6.0, \par TSH- 0.558 <-- 0.39, FT4- 1.03 <--  1.0, T3-138 <-- 116\par labs (4/24/18)- a1c- 6.0, TSH -0.4, FT4-0.94, T3-103, LDL-128, TC-202, 25D- 36\par \par Thyr US (11/27/18)- multiple b/l nodules, incl RLP 1.8, RMP 1.6, LMP 1.4. All described as stable\par Thyr US (5/1/18)- enlarged thyroid. multiple b/l nodulkes, incl dominant Rt inferior 1.8 complex w/ calc (prev FNA'd); LMP 2.0 complex with calc\par Thyr US (11/2/17)- multiple b/l nodules, incl Rt inferior 1.9cm complex and Lt superior 1.9 w/ calc. Appear stable\par Thyr US (5/1/17)- multiple b/l nodules, stable., incl dominant Rt inf complex 2.0cm; RMP calcified 1.0cm, LMP 1.9cm calcified\par Thyr US (9/20/16)- mutiple b/l nodules. Rt inferior 1.9cm (+ calcif), RMP calcified 1.0cm; LMP 1.8cm calcified. 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 RLP 2.0cm- AUS in a background of lymph thyroiditis (Katia III). \par \par ***Thyroseq- no mutations assoc w/ high risk cancer. TSHR mutation identified (10% freq)- this mutation is assos w/ low risk ca if  freq > 30%. therefore, likely to be a benign nodule\par \par Abd US (5/1/18)- several gallbladder polyps upto 0.4cm\par Abd US- 0.4cm gallbladder polyp\par \par *** intolerant of fosamax (myalgia)\par seeing Dr. Jones (rheum)- for poss prolia vs reclast arranged by him, but is presently on hold b/o dental implant work\par \par saw neuro (dr. Langston)- apparently nl w/up\par \par DXA (5/16/19)- LS (-1.7) with OA changes in L1 (-1.3); L2-L4 (-1.8), RFN (-2.4), radius 33% (-2.1).  FRAX - 13% and 3.1%\par DXA (5/1/17)- LS (-2.1) with L1 (-2.5), Rt FN (-2.5). \par \par *** started fosamax in 11/17, stopped  after few doses\par \par DXA (5/19/15)- FN (-1.9),  LS (-1.7), wrist- (-1.5)\par FRAX - m/op- 11% , hip- 1.8%\par \par \par 24h I-123 U+S (3/31/16)- 24.3%; dominant "cold" region in LMLP lobe. \par \par labs (3/29/16)- TSH- 0.10, FT4- 0.78, T3- 102, + Tg/TPO ab. neg TSI/TBII\par \par Thyr US (12/10/15)- multiple b/l nodules, incl LUP iso vascular 1.6x1.2x10, RMP iso vascular 1.6x1.5x0.9; RLP hypo, avascular 1.9x1.9x1.4\par \par \par \par HPI:\par with history of Hashimoto's thyroiditis, diagnosed in 2000. Initially on synthroid (seen by Dr. De La Vega) for 1-2 years, then switched to Dr. High, and eventually stopped synthroid because of low TSH. Has been followed annually for a "mildly low TSH", but did not require any intervention.\par \par Also, was diagnosed with MNG, recalls a benign FNA of one of the nodule several years ago.\par \par No US/ FNA reports are available.\par \par Reports having a DXA scan done last year, but is not aware of results.\par \par Sister was just diagnosed with a papillary thyroid cancer. Denies history of radiation exposure to head and neck area in a childhood. \par \par Labs (3/7/16)- TSH - 0.06, FT4- 1.07, T3- 111, a1c- 5.9\par labs from 12/15- TSH - 0.078, FT4- 1.09, a1c- 6.0\par \par

## 2020-11-24 LAB
TESTOST BND SERPL-MCNC: 1.8 PG/ML
TESTOST SERPL-MCNC: 43 NG/DL
VIT B6 SERPL-MCNC: 102.5 UG/L

## 2020-12-03 LAB
DHEA-S SERPL-MCNC: 57.7 UG/DL
SARS-COV-2 IGG SERPL IA-ACNC: 0.08 INDEX
SARS-COV-2 IGG SERPL QL IA: NEGATIVE

## 2020-12-09 LAB
17OH-PREG SERPL-MCNC: <6 NG/DL
17OHP SERPL-MCNC: 26 NG/DL
ANDROST SERPL-MCNC: 37 NG/DL
SHBG SERPL-SCNC: 40 NMOL/L
TESTOST BND SERPL-MCNC: 1.6 PG/ML
TESTOST SERPL-MCNC: 40.5 NG/DL

## 2021-01-06 ENCOUNTER — RX RENEWAL (OUTPATIENT)
Age: 71
End: 2021-01-06

## 2021-02-02 ENCOUNTER — TRANSCRIPTION ENCOUNTER (OUTPATIENT)
Age: 71
End: 2021-02-02

## 2021-04-21 ENCOUNTER — LABORATORY RESULT (OUTPATIENT)
Age: 71
End: 2021-04-21

## 2021-04-21 LAB
25(OH)D3 SERPL-MCNC: 44.1 NG/ML
ALBUMIN SERPL ELPH-MCNC: 4.1 G/DL
ALP BLD-CCNC: 66 U/L
ALT SERPL-CCNC: 22 U/L
ANION GAP SERPL CALC-SCNC: 10 MMOL/L
AST SERPL-CCNC: 19 U/L
BASOPHILS # BLD AUTO: 0.02 K/UL
BASOPHILS NFR BLD AUTO: 0.3 %
BILIRUB SERPL-MCNC: 0.5 MG/DL
BUN SERPL-MCNC: 17 MG/DL
CALCIUM SERPL-MCNC: 9.4 MG/DL
CHLORIDE SERPL-SCNC: 104 MMOL/L
CHOLEST SERPL-MCNC: 192 MG/DL
CO2 SERPL-SCNC: 26 MMOL/L
CREAT SERPL-MCNC: 0.91 MG/DL
CREAT SPEC-SCNC: 46 MG/DL
EOSINOPHIL # BLD AUTO: 0.21 K/UL
EOSINOPHIL NFR BLD AUTO: 3.2 %
ESTIMATED AVERAGE GLUCOSE: 126 MG/DL
FOLATE SERPL-MCNC: >20 NG/ML
GLUCOSE SERPL-MCNC: 112 MG/DL
HBA1C MFR BLD HPLC: 6 %
HCT VFR BLD CALC: 40.4 %
HDLC SERPL-MCNC: 64 MG/DL
HGB BLD-MCNC: 12.6 G/DL
IMM GRANULOCYTES NFR BLD AUTO: 0.2 %
LDLC SERPL CALC-MCNC: 113 MG/DL
LYMPHOCYTES # BLD AUTO: 2.5 K/UL
LYMPHOCYTES NFR BLD AUTO: 37.7 %
MAN DIFF?: NORMAL
MCHC RBC-ENTMCNC: 29.4 PG
MCHC RBC-ENTMCNC: 31.2 GM/DL
MCV RBC AUTO: 94.2 FL
MICROALBUMIN 24H UR DL<=1MG/L-MCNC: 1.7 MG/DL
MICROALBUMIN/CREAT 24H UR-RTO: 37 MG/G
MONOCYTES # BLD AUTO: 0.47 K/UL
MONOCYTES NFR BLD AUTO: 7.1 %
NEUTROPHILS # BLD AUTO: 3.42 K/UL
NEUTROPHILS NFR BLD AUTO: 51.5 %
NONHDLC SERPL-MCNC: 128 MG/DL
PLATELET # BLD AUTO: 232 K/UL
POTASSIUM SERPL-SCNC: 4.6 MMOL/L
PROT SERPL-MCNC: 6.6 G/DL
RBC # BLD: 4.29 M/UL
RBC # FLD: 14.2 %
SODIUM SERPL-SCNC: 140 MMOL/L
T3 SERPL-MCNC: 122 NG/DL
T4 FREE SERPL-MCNC: 1 NG/DL
TRIGL SERPL-MCNC: 77 MG/DL
TSH SERPL-ACNC: 0.21 UIU/ML
VIT B12 SERPL-MCNC: 726 PG/ML
WBC # FLD AUTO: 6.63 K/UL

## 2021-04-29 ENCOUNTER — APPOINTMENT (OUTPATIENT)
Dept: ENDOCRINOLOGY | Facility: CLINIC | Age: 71
End: 2021-04-29
Payer: MEDICARE

## 2021-04-29 VITALS
HEART RATE: 79 BPM | WEIGHT: 193 LBS | SYSTOLIC BLOOD PRESSURE: 140 MMHG | BODY MASS INDEX: 30.29 KG/M2 | DIASTOLIC BLOOD PRESSURE: 80 MMHG | HEIGHT: 67 IN | RESPIRATION RATE: 16 BRPM | OXYGEN SATURATION: 98 % | TEMPERATURE: 97.7 F

## 2021-04-29 DIAGNOSIS — L65.9 NONSCARRING HAIR LOSS, UNSPECIFIED: ICD-10-CM

## 2021-04-29 PROCEDURE — 99214 OFFICE O/P EST MOD 30 MIN: CPT

## 2021-04-29 RX ORDER — OLMESARTAN MEDOXOMIL AND HYDROCHLOROTHIAZIDE 40; 12.5 MG/1; MG/1
40-12.5 TABLET ORAL
Qty: 30 | Refills: 0 | Status: DISCONTINUED | COMMUNITY
Start: 2018-09-04 | End: 2021-04-29

## 2021-04-29 RX ORDER — METOPROLOL SUCCINATE 50 MG/1
50 TABLET, EXTENDED RELEASE ORAL DAILY
Qty: 90 | Refills: 3 | Status: DISCONTINUED | COMMUNITY
Start: 2019-03-13 | End: 2021-04-29

## 2021-04-29 RX ORDER — CANDESARTAN CILEXETIL 8 MG/1
8 TABLET ORAL DAILY
Qty: 30 | Refills: 4 | Status: DISCONTINUED | COMMUNITY
Start: 2019-05-15 | End: 2021-04-29

## 2021-04-29 NOTE — HISTORY OF PRESENT ILLNESS
[FreeTextEntry1] : 70 yo female f/u for MNG/ hyperthyroidism\par \par *** Apr 29, 2021 ***\par \par s/p covid  in 2/21. \par feels well, less hair loss. with a post -covid fatigue\par Abd US (1/27/21)-  stable subcm GB polyp\par labs as below\par \par *** Nov 23, 2020 ***\par \par feels well overall. less hair loss- used PRP therapy\par on mmi 2.5 mg qd\par \par *** Jun 16, 2020 ***\par \par lost 20 lbs on the diet. feels well overall \par taking mmi 2.5 mg qd \par Thyr US (12/3/19)- stable b/l multiple thyroid nodules\par \par *** Dec 11, 2019 ***\par \par on mmi alternating 2.5mg and 5 mg\par TSH- 0.2, FT4- 0.9\par a1c- 5.9\par \par Thyr US (12/3/19)- stable multiple b/l nodules, incl RLP 1.9, RMP- 1.7, LLP 1.6\par \par \par *** Sep 11, 2019 ***\par stopped BP meds b/o BP is fine. Taking valerian root, less stres\par LDL- 111, fasting glu- 113\par TSH- 0.11\par FT4- 1.1, T3- 124\par a1c- 6.0 <-- 6.1\par \par feels well  on mmi 2.5mg qd . off inderal. \par \par *** June 06, 2019 ***\par \par taking HCTZ prn only. using valerian root with BP improvement\par *** weight gain/gluid retention on procardia\par *** short-term memory loss on toprol\par ***  cough on benicar/HCT\par TSH-0.1 (missed meds), FT4- 1.3, T3- 155\par \par no nausea on a smaller dose\par \par saw Dr. Ferguson, reports nl work up. \par a1c- 6.1 <--  6.0 <-- 6.0, \par TSH- 0.558 <-- 0.39, FT4- 1.03 <--  1.0, T3-138 <-- 116\par labs (4/24/18)- a1c- 6.0, TSH -0.4, FT4-0.94, T3-103, LDL-128, TC-202, 25D- 36\par \par Thyr US (11/27/18)- multiple b/l nodules, incl RLP 1.8, RMP 1.6, LMP 1.4. All described as stable\par Thyr US (5/1/18)- enlarged thyroid. multiple b/l nodulkes, incl dominant Rt inferior 1.8 complex w/ calc (prev FNA'd); LMP 2.0 complex with calc\par Thyr US (11/2/17)- multiple b/l nodules, incl Rt inferior 1.9cm complex and Lt superior 1.9 w/ calc. Appear stable\par Thyr US (5/1/17)- multiple b/l nodules, stable., incl dominant Rt inf complex 2.0cm; RMP calcified 1.0cm, LMP 1.9cm calcified\par Thyr US (9/20/16)- mutiple b/l nodules. Rt inferior 1.9cm (+ calcif), RMP calcified 1.0cm; LMP 1.8cm calcified. 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 RLP 2.0cm- AUS in a background of lymph thyroiditis (Katia III). \par \par ***Thyroseq- no mutations assoc w/ high risk cancer. TSHR mutation identified (10% freq)- this mutation is assos w/ low risk ca if  freq > 30%. therefore, likely to be a benign nodule\par \par Abd US (5/1/18)- several gallbladder polyps upto 0.4cm\par Abd US- 0.4cm gallbladder polyp\par \par *** intolerant of fosamax (myalgia)\par seeing Dr. Jones (rheum)- for poss prolia vs reclast arranged by him, but is presently on hold b/o dental implant work\par \par saw neuro (dr. Langston)- apparently nl w/up\par \par DXA (5/16/19)- LS (-1.7) with OA changes in L1 (-1.3); L2-L4 (-1.8), RFN (-2.4), radius 33% (-2.1).  FRAX - 13% and 3.1%\par DXA (5/1/17)- LS (-2.1) with L1 (-2.5), Rt FN (-2.5). \par \par *** started fosamax in 11/17, stopped  after few doses\par \par DXA (5/19/15)- FN (-1.9),  LS (-1.7), wrist- (-1.5)\par FRAX - m/op- 11% , hip- 1.8%\par \par \par 24h I-123 U+S (3/31/16)- 24.3%; dominant "cold" region in LMLP lobe. \par \par labs (3/29/16)- TSH- 0.10, FT4- 0.78, T3- 102, + Tg/TPO ab. neg TSI/TBII\par \par Thyr US (12/10/15)- multiple b/l nodules, incl LUP iso vascular 1.6x1.2x10, RMP iso vascular 1.6x1.5x0.9; RLP hypo, avascular 1.9x1.9x1.4\par \par \par \par HPI:\par with history of Hashimoto's thyroiditis, diagnosed in 2000. Initially on synthroid (seen by Dr. De La Vega) for 1-2 years, then switched to Dr. High, and eventually stopped synthroid because of low TSH. Has been followed annually for a "mildly low TSH", but did not require any intervention.\par \par Also, was diagnosed with MNG, recalls a benign FNA of one of the nodule several years ago.\par \par No US/ FNA reports are available.\par \par Reports having a DXA scan done last year, but is not aware of results.\par \par Sister was just diagnosed with a papillary thyroid cancer. Denies history of radiation exposure to head and neck area in a childhood. \par \par Labs (3/7/16)- TSH - 0.06, FT4- 1.07, T3- 111, a1c- 5.9\par labs from 12/15- TSH - 0.078, FT4- 1.09, a1c- 6.0\par \par

## 2021-04-29 NOTE — ASSESSMENT
[Carbohydrate Consistent Diet] : carbohydrate consistent diet [Methimazole Therapy] : Risks and benefits of methimazole therapy were discussed with the patient,  including rash, liver dysfunction, and agranulocytosis.  Patient was instructed to call the office for flu-like symptoms eg fever and sore-throat [FreeTextEntry1] : Given osteopenia and incr risk for AFib\par - incr  mmi 2.5 mg 5/wk + 5 mg 2/wk\par - monitor neutrophils\par - prev d/w pt poss total tx - again reviewed risks of AUS and current thyroseq analysis report. Pt declines total tx for now\par - close BP monitoring, cont benicar\par keep off fosamax. to see rheum for prolia vs reclast (R+B)- per pt, is on hold for now b/o doing dental implants\par - given nodular stability , will rpt thyr US in 12/20. prev d/w pt potential reFNA  next year given + FHX and prior AUS pathology\par - again, reviewed need in statins- pt declined at present\par - advised to see GI  (Dr. Rocha) for gallbladder polyp\par - rpt DXA 3 sites in 05/21. advised on medical tx given elev FRAX score- she's reluctant at present\par RTC 3-4 mos, or sooner prn. labs prior.

## 2021-07-11 ENCOUNTER — RX RENEWAL (OUTPATIENT)
Age: 71
End: 2021-07-11

## 2021-07-22 DIAGNOSIS — Z86.69 PERSONAL HISTORY OF OTHER DISEASES OF THE NERVOUS SYSTEM AND SENSE ORGANS: ICD-10-CM

## 2021-08-13 LAB
BASOPHILS # BLD AUTO: 0.02 K/UL
BASOPHILS NFR BLD AUTO: 0.3 %
EOSINOPHIL # BLD AUTO: 0.12 K/UL
EOSINOPHIL NFR BLD AUTO: 1.9 %
HCT VFR BLD CALC: 39 %
HGB BLD-MCNC: 13 G/DL
IMM GRANULOCYTES NFR BLD AUTO: 0.2 %
LYMPHOCYTES # BLD AUTO: 2.95 K/UL
LYMPHOCYTES NFR BLD AUTO: 46.5 %
MAN DIFF?: NORMAL
MCHC RBC-ENTMCNC: 30.6 PG
MCHC RBC-ENTMCNC: 33.3 GM/DL
MCV RBC AUTO: 91.8 FL
MONOCYTES # BLD AUTO: 0.55 K/UL
MONOCYTES NFR BLD AUTO: 8.7 %
NEUTROPHILS # BLD AUTO: 2.7 K/UL
NEUTROPHILS NFR BLD AUTO: 42.4 %
PLATELET # BLD AUTO: 231 K/UL
RBC # BLD: 4.25 M/UL
RBC # FLD: 14.1 %
WBC # FLD AUTO: 6.35 K/UL

## 2021-08-15 LAB
25(OH)D3 SERPL-MCNC: 47.1 NG/ML
ALBUMIN SERPL ELPH-MCNC: 4.3 G/DL
ALP BLD-CCNC: 68 U/L
ALT SERPL-CCNC: 26 U/L
ANION GAP SERPL CALC-SCNC: 12 MMOL/L
AST SERPL-CCNC: 26 U/L
BILIRUB SERPL-MCNC: 0.5 MG/DL
BUN SERPL-MCNC: 23 MG/DL
CALCIUM SERPL-MCNC: 9.5 MG/DL
CHLORIDE SERPL-SCNC: 106 MMOL/L
CHOLEST SERPL-MCNC: 198 MG/DL
CO2 SERPL-SCNC: 23 MMOL/L
COVID-19 NUCLEOCAPSID  GAM ANTIBODY INTERPRETATION: POSITIVE
COVID-19 SPIKE DOMAIN ANTIBODY INTERPRETATION: POSITIVE
CREAT SERPL-MCNC: 1.02 MG/DL
CREAT SPEC-SCNC: 70 MG/DL
ESTIMATED AVERAGE GLUCOSE: 120 MG/DL
GLUCOSE SERPL-MCNC: 116 MG/DL
HBA1C MFR BLD HPLC: 5.8 %
HDLC SERPL-MCNC: 66 MG/DL
LDLC SERPL CALC-MCNC: 119 MG/DL
MICROALBUMIN 24H UR DL<=1MG/L-MCNC: <1.2 MG/DL
MICROALBUMIN/CREAT 24H UR-RTO: NORMAL MG/G
NONHDLC SERPL-MCNC: 132 MG/DL
POTASSIUM SERPL-SCNC: 5 MMOL/L
PROT SERPL-MCNC: 6.7 G/DL
SARS-COV-2 AB SERPL IA-ACNC: >250 U/ML
SARS-COV-2 AB SERPL QL IA: 14.2 INDEX
SODIUM SERPL-SCNC: 141 MMOL/L
T3 SERPL-MCNC: 122 NG/DL
T4 FREE SERPL-MCNC: 1 NG/DL
TRIGL SERPL-MCNC: 67 MG/DL
TSH SERPL-ACNC: 0.26 UIU/ML

## 2021-08-19 ENCOUNTER — APPOINTMENT (OUTPATIENT)
Dept: ENDOCRINOLOGY | Facility: CLINIC | Age: 71
End: 2021-08-19
Payer: MEDICARE

## 2021-08-19 VITALS
OXYGEN SATURATION: 98 % | RESPIRATION RATE: 16 BRPM | BODY MASS INDEX: 31.23 KG/M2 | SYSTOLIC BLOOD PRESSURE: 128 MMHG | WEIGHT: 199 LBS | TEMPERATURE: 97.6 F | DIASTOLIC BLOOD PRESSURE: 70 MMHG | HEIGHT: 67 IN | HEART RATE: 79 BPM

## 2021-08-19 PROCEDURE — 99214 OFFICE O/P EST MOD 30 MIN: CPT

## 2021-08-19 NOTE — ASSESSMENT
[Carbohydrate Consistent Diet] : carbohydrate consistent diet [Methimazole Therapy] : Risks and benefits of methimazole therapy were discussed with the patient,  including rash, liver dysfunction, and agranulocytosis.  Patient was instructed to call the office for flu-like symptoms eg fever and sore-throat [FreeTextEntry1] : Given osteopenia and incr risk for AFib\par - cont mmi 2.5 mg qd\par - monitor neutrophils\par - prev d/w pt poss total tx - again reviewed risks of AUS and current thyroseq analysis report. Pt declines total tx for now\par - close BP monitoring, cont benicar\par keep off fosamax. to see rheum for prolia vs reclast (R+B)- per pt, is on hold for now b/o doing dental implants\par - given nodular stability , will rpt thyr US in 12/20. prev d/w pt potential reFNA  next year given + FHX and prior AUS pathology\par - again, reviewed need in statins- pt declined at present\par - advised to see GI  (Dr. Rocha) for gallbladder polyp\par - rpt DXA 3 sites this month. advised on medical tx given elev FRAX score- she's reluctant at present\par RTC 4-6 mos, or sooner prn. labs prior.

## 2021-08-19 NOTE — HISTORY OF PRESENT ILLNESS
[FreeTextEntry1] : 70 yo female f/u for MNG/ hyperthyroidism\par \par *** Aug 19, 2021 ***\par \par still  on mmi 2.5 mg qd. post covid fatigue\par otherwise, no new c/o\par denies palpitations\par \par *** Apr 29, 2021 ***\par \par s/p covid  in 2/21. \par feels well, less hair loss. with a post -covid fatigue\par Abd US (1/27/21)-  stable subcm GB polyp\par labs as below\par \par *** Nov 23, 2020 ***\par \par feels well overall. less hair loss- used PRP therapy\par on mmi 2.5 mg qd\par \par *** Jun 16, 2020 ***\par \par lost 20 lbs on the diet. feels well overall \par taking mmi 2.5 mg qd \par Thyr US (12/3/19)- stable b/l multiple thyroid nodules\par \par *** Dec 11, 2019 ***\par \par on mmi alternating 2.5mg and 5 mg\par TSH- 0.2, FT4- 0.9\par a1c- 5.9\par \par Thyr US (12/3/19)- stable multiple b/l nodules, incl RLP 1.9, RMP- 1.7, LLP 1.6\par \par \par *** Sep 11, 2019 ***\par stopped BP meds b/o BP is fine. Taking valerian root, less stres\par LDL- 111, fasting glu- 113\par TSH- 0.11\par FT4- 1.1, T3- 124\par a1c- 6.0 <-- 6.1\par \par feels well  on mmi 2.5mg qd . off inderal. \par \par *** June 06, 2019 ***\par \par taking HCTZ prn only. using valerian root with BP improvement\par *** weight gain/gluid retention on procardia\par *** short-term memory loss on toprol\par ***  cough on benicar/HCT\par TSH-0.1 (missed meds), FT4- 1.3, T3- 155\par \par no nausea on a smaller dose\par \par saw Dr. Ferguson, reports nl work up. \par a1c- 6.1 <--  6.0 <-- 6.0, \par TSH- 0.558 <-- 0.39, FT4- 1.03 <--  1.0, T3-138 <-- 116\par labs (4/24/18)- a1c- 6.0, TSH -0.4, FT4-0.94, T3-103, LDL-128, TC-202, 25D- 36\par \par Thyr US (11/27/18)- multiple b/l nodules, incl RLP 1.8, RMP 1.6, LMP 1.4. All described as stable\par Thyr US (5/1/18)- enlarged thyroid. multiple b/l nodulkes, incl dominant Rt inferior 1.8 complex w/ calc (prev FNA'd); LMP 2.0 complex with calc\par Thyr US (11/2/17)- multiple b/l nodules, incl Rt inferior 1.9cm complex and Lt superior 1.9 w/ calc. Appear stable\par Thyr US (5/1/17)- multiple b/l nodules, stable., incl dominant Rt inf complex 2.0cm; RMP calcified 1.0cm, LMP 1.9cm calcified\par Thyr US (9/20/16)- mutiple b/l nodules. Rt inferior 1.9cm (+ calcif), RMP calcified 1.0cm; LMP 1.8cm calcified. 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 RLP 2.0cm- AUS in a background of lymph thyroiditis (Katia III). \par \par ***Thyroseq- no mutations assoc w/ high risk cancer. TSHR mutation identified (10% freq)- this mutation is assos w/ low risk ca if  freq > 30%. therefore, likely to be a benign nodule\par \par Abd US (5/1/18)- several gallbladder polyps upto 0.4cm\par Abd US- 0.4cm gallbladder polyp\par \par *** intolerant of fosamax (myalgia)\par seeing Dr. Jones (rheum)- for poss prolia vs reclast arranged by him, but is presently on hold b/o dental implant work\par \par saw neuro (dr. Langston)- apparently nl w/up\par \par DXA (5/16/19)- LS (-1.7) with OA changes in L1 (-1.3); L2-L4 (-1.8), RFN (-2.4), radius 33% (-2.1).  FRAX - 13% and 3.1%\par DXA (5/1/17)- LS (-2.1) with L1 (-2.5), Rt FN (-2.5). \par \par *** started fosamax in 11/17, stopped  after few doses\par \par DXA (5/19/15)- FN (-1.9),  LS (-1.7), wrist- (-1.5)\par FRAX - m/op- 11% , hip- 1.8%\par \par \par 24h I-123 U+S (3/31/16)- 24.3%; dominant "cold" region in LMLP lobe. \par \par labs (3/29/16)- TSH- 0.10, FT4- 0.78, T3- 102, + Tg/TPO ab. neg TSI/TBII\par \par Thyr US (12/10/15)- multiple b/l nodules, incl LUP iso vascular 1.6x1.2x10, RMP iso vascular 1.6x1.5x0.9; RLP hypo, avascular 1.9x1.9x1.4\par \par \par \par HPI:\par with history of Hashimoto's thyroiditis, diagnosed in 2000. Initially on synthroid (seen by Dr. De La Vega) for 1-2 years, then switched to Dr. High, and eventually stopped synthroid because of low TSH. Has been followed annually for a "mildly low TSH", but did not require any intervention.\par \par Also, was diagnosed with MNG, recalls a benign FNA of one of the nodule several years ago.\par \par No US/ FNA reports are available.\par \par Reports having a DXA scan done last year, but is not aware of results.\par \par Sister was just diagnosed with a papillary thyroid cancer. Denies history of radiation exposure to head and neck area in a childhood. \par \par Labs (3/7/16)- TSH - 0.06, FT4- 1.07, T3- 111, a1c- 5.9\par labs from 12/15- TSH - 0.078, FT4- 1.09, a1c- 6.0\par \par

## 2021-08-31 ENCOUNTER — RX RENEWAL (OUTPATIENT)
Age: 71
End: 2021-08-31

## 2021-09-15 ENCOUNTER — RX RENEWAL (OUTPATIENT)
Age: 71
End: 2021-09-15

## 2021-10-11 ENCOUNTER — TRANSCRIPTION ENCOUNTER (OUTPATIENT)
Age: 71
End: 2021-10-11

## 2021-10-11 LAB
COVID-19 NUCLEOCAPSID  GAM ANTIBODY INTERPRETATION: POSITIVE
COVID-19 SPIKE DOMAIN ANTIBODY INTERPRETATION: POSITIVE
SARS-COV-2 AB SERPL IA-ACNC: >250 U/ML
SARS-COV-2 AB SERPL QL IA: 8.61 INDEX

## 2021-12-22 ENCOUNTER — TRANSCRIPTION ENCOUNTER (OUTPATIENT)
Age: 71
End: 2021-12-22

## 2022-02-24 LAB
25(OH)D3 SERPL-MCNC: 37.5 NG/ML
ALBUMIN SERPL ELPH-MCNC: 4.1 G/DL
ALP BLD-CCNC: 63 U/L
ALT SERPL-CCNC: 19 U/L
ANION GAP SERPL CALC-SCNC: 13 MMOL/L
AST SERPL-CCNC: 21 U/L
BASOPHILS # BLD AUTO: 0.03 K/UL
BASOPHILS NFR BLD AUTO: 0.5 %
BILIRUB SERPL-MCNC: 0.5 MG/DL
BUN SERPL-MCNC: 19 MG/DL
CALCIUM SERPL-MCNC: 9.4 MG/DL
CHLORIDE SERPL-SCNC: 106 MMOL/L
CHOLEST SERPL-MCNC: 179 MG/DL
CO2 SERPL-SCNC: 23 MMOL/L
CREAT SERPL-MCNC: 0.97 MG/DL
EOSINOPHIL # BLD AUTO: 0.14 K/UL
EOSINOPHIL NFR BLD AUTO: 2.3 %
ESTIMATED AVERAGE GLUCOSE: 128 MG/DL
GLUCOSE SERPL-MCNC: 112 MG/DL
HBA1C MFR BLD HPLC: 6.1 %
HCT VFR BLD CALC: 40.2 %
HDLC SERPL-MCNC: 59 MG/DL
HGB BLD-MCNC: 12.6 G/DL
IMM GRANULOCYTES NFR BLD AUTO: 0.3 %
LDLC SERPL CALC-MCNC: 109 MG/DL
LYMPHOCYTES # BLD AUTO: 2.95 K/UL
LYMPHOCYTES NFR BLD AUTO: 47.4 %
MAN DIFF?: NORMAL
MCHC RBC-ENTMCNC: 29.4 PG
MCHC RBC-ENTMCNC: 31.3 GM/DL
MCV RBC AUTO: 93.7 FL
MONOCYTES # BLD AUTO: 0.57 K/UL
MONOCYTES NFR BLD AUTO: 9.2 %
NEUTROPHILS # BLD AUTO: 2.51 K/UL
NEUTROPHILS NFR BLD AUTO: 40.3 %
NONHDLC SERPL-MCNC: 121 MG/DL
PLATELET # BLD AUTO: 233 K/UL
POTASSIUM SERPL-SCNC: 4.8 MMOL/L
PROT SERPL-MCNC: 6.5 G/DL
RBC # BLD: 4.29 M/UL
RBC # FLD: 14 %
SODIUM SERPL-SCNC: 142 MMOL/L
T3 SERPL-MCNC: 152 NG/DL
T4 FREE SERPL-MCNC: 1.3 NG/DL
TRIGL SERPL-MCNC: 60 MG/DL
TSH SERPL-ACNC: 0.02 UIU/ML
WBC # FLD AUTO: 6.22 K/UL

## 2022-02-28 ENCOUNTER — APPOINTMENT (OUTPATIENT)
Dept: ENDOCRINOLOGY | Facility: CLINIC | Age: 72
End: 2022-02-28
Payer: MEDICARE

## 2022-02-28 DIAGNOSIS — R73.02 IMPAIRED GLUCOSE TOLERANCE (ORAL): ICD-10-CM

## 2022-02-28 PROCEDURE — 99443: CPT

## 2022-02-28 NOTE — ASSESSMENT
[Carbohydrate Consistent Diet] : carbohydrate consistent diet [Methimazole Therapy] : Risks and benefits of methimazole therapy were discussed with the patient,  including rash, liver dysfunction, and agranulocytosis.  Patient was instructed to call the office for flu-like symptoms eg fever and sore-throat [FreeTextEntry1] : Given osteopenia and incr risk for AFib\par - incr mmi 2.5 mg qd 4/wk + 5 mg 3/wk\par - monitor neutrophils\par - prev d/w pt poss total tx - again reviewed risks of AUS and current thyroseq analysis report. Pt declines total tx for now\par - close BP monitoring, cont benicar\par keep off fosamax. we discussed prolia vs reclast (R+B)- per pt, is on hold for now b/o doing dental implants\par - given nodular stability , will rpt thyr US this month. prev d/w pt potential reFNA  next year given + FHX and prior AUS pathology\par - again, reviewed need in statins- pt declined at present\par - advised to see GI  (Dr. Rocha) for gallbladder polyp\par - consider hem/onc eval for persistent lymphocytosis\par RTC 3 mos, or sooner prn. labs prior.

## 2022-02-28 NOTE — HISTORY OF PRESENT ILLNESS
[Home] : at home, [unfilled] , at the time of the visit. [Medical Office: ([facility])___] : at the medical office located in  [Verbal consent obtained from patient] : the patient, [unfilled] [FreeTextEntry1] : 70 yo female f/u for MNG/ hyperthyroidism\par \par *** Phone visit  Feb 28, 2022 ***\par \par feels well overall. diet is a bit worse\par taking mmi 2.5 mg qd\par DXA (12/8/21)- L2-L4 (-2.1), FN (-2.5)\par \par *** Aug 19, 2021 ***\par \par still  on mmi 2.5 mg qd. post covid fatigue\par otherwise, no new c/o\par denies palpitations\par \par *** Apr 29, 2021 ***\par \par s/p covid  in 2/21. \par feels well, less hair loss. with a post -covid fatigue\par Abd US (1/27/21)-  stable subcm GB polyp\par labs as below\par \par *** Nov 23, 2020 ***\par \par feels well overall. less hair loss- used PRP therapy\par on mmi 2.5 mg qd\par \par *** Jun 16, 2020 ***\par \par lost 20 lbs on the diet. feels well overall \par taking mmi 2.5 mg qd \par Thyr US (12/3/19)- stable b/l multiple thyroid nodules\par \par *** Dec 11, 2019 ***\par \par on mmi alternating 2.5mg and 5 mg\par TSH- 0.2, FT4- 0.9\par a1c- 5.9\par \par Thyr US (12/3/19)- stable multiple b/l nodules, incl RLP 1.9, RMP- 1.7, LLP 1.6\par \par \par *** Sep 11, 2019 ***\par stopped BP meds b/o BP is fine. Taking valerian root, less stres\par LDL- 111, fasting glu- 113\par TSH- 0.11\par FT4- 1.1, T3- 124\par a1c- 6.0 <-- 6.1\par \par feels well  on mmi 2.5mg qd . off inderal. \par \par *** June 06, 2019 ***\par \par taking HCTZ prn only. using valerian root with BP improvement\par *** weight gain/gluid retention on procardia\par *** short-term memory loss on toprol\par ***  cough on benicar/HCT\par TSH-0.1 (missed meds), FT4- 1.3, T3- 155\par \par no nausea on a smaller dose\par \par saw Dr. Ferguson, reports nl work up. \par a1c- 6.1 <--  6.0 <-- 6.0, \par TSH- 0.558 <-- 0.39, FT4- 1.03 <--  1.0, T3-138 <-- 116\par labs (4/24/18)- a1c- 6.0, TSH -0.4, FT4-0.94, T3-103, LDL-128, TC-202, 25D- 36\par \par Thyr US (11/27/18)- multiple b/l nodules, incl RLP 1.8, RMP 1.6, LMP 1.4. All described as stable\par Thyr US (5/1/18)- enlarged thyroid. multiple b/l nodulkes, incl dominant Rt inferior 1.8 complex w/ calc (prev FNA'd); LMP 2.0 complex with calc\par Thyr US (11/2/17)- multiple b/l nodules, incl Rt inferior 1.9cm complex and Lt superior 1.9 w/ calc. Appear stable\par Thyr US (5/1/17)- multiple b/l nodules, stable., incl dominant Rt inf complex 2.0cm; RMP calcified 1.0cm, LMP 1.9cm calcified\par Thyr US (9/20/16)- mutiple b/l nodules. Rt inferior 1.9cm (+ calcif), RMP calcified 1.0cm; LMP 1.8cm calcified. 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 RLP 2.0cm- AUS in a background of lymph thyroiditis (Katia III). \par \par ***Thyroseq- no mutations assoc w/ high risk cancer. TSHR mutation identified (10% freq)- this mutation is assos w/ low risk ca if  freq > 30%. therefore, likely to be a benign nodule\par \par Abd US (5/1/18)- several gallbladder polyps upto 0.4cm\par Abd US- 0.4cm gallbladder polyp\par \par *** intolerant of fosamax (myalgia)\par seeing Dr. Jones (rheum)- for poss prolia vs reclast arranged by him, but is presently on hold b/o dental implant work\par \par saw neuro (dr. Langston)- apparently nl w/up\par \par DXA (5/16/19)- LS (-1.7) with OA changes in L1 (-1.3); L2-L4 (-1.8), RFN (-2.4), radius 33% (-2.1).  FRAX - 13% and 3.1%\par DXA (5/1/17)- LS (-2.1) with L1 (-2.5), Rt FN (-2.5). \par \par *** started fosamax in 11/17, stopped  after few doses\par \par DXA (5/19/15)- FN (-1.9),  LS (-1.7), wrist- (-1.5)\par FRAX - m/op- 11% , hip- 1.8%\par \par \par 24h I-123 U+S (3/31/16)- 24.3%; dominant "cold" region in LMLP lobe. \par \par labs (3/29/16)- TSH- 0.10, FT4- 0.78, T3- 102, + Tg/TPO ab. neg TSI/TBII\par \par Thyr US (12/10/15)- multiple b/l nodules, incl LUP iso vascular 1.6x1.2x10, RMP iso vascular 1.6x1.5x0.9; RLP hypo, avascular 1.9x1.9x1.4\par \par \par \par HPI:\par with history of Hashimoto's thyroiditis, diagnosed in 2000. Initially on synthroid (seen by Dr. De La Vega) for 1-2 years, then switched to Dr. High, and eventually stopped synthroid because of low TSH. Has been followed annually for a "mildly low TSH", but did not require any intervention.\par \par Also, was diagnosed with MNG, recalls a benign FNA of one of the nodule several years ago.\par \par No US/ FNA reports are available.\par \par Reports having a DXA scan done last year, but is not aware of results.\par \par Sister was just diagnosed with a papillary thyroid cancer. Denies history of radiation exposure to head and neck area in a childhood. \par \par Labs (3/7/16)- TSH - 0.06, FT4- 1.07, T3- 111, a1c- 5.9\par labs from 12/15- TSH - 0.078, FT4- 1.09, a1c- 6.0\par \par

## 2022-04-11 PROBLEM — Z11.59 SCREENING FOR VIRAL DISEASE: Status: ACTIVE | Noted: 2020-06-16

## 2022-04-26 ENCOUNTER — RESULT CHARGE (OUTPATIENT)
Age: 72
End: 2022-04-26

## 2022-04-26 ENCOUNTER — APPOINTMENT (OUTPATIENT)
Dept: ORTHOPEDIC SURGERY | Facility: CLINIC | Age: 72
End: 2022-04-26
Payer: MEDICARE

## 2022-04-26 DIAGNOSIS — S70.01XA CONTUSION OF RIGHT HIP, INITIAL ENCOUNTER: ICD-10-CM

## 2022-04-26 DIAGNOSIS — M54.16 RADICULOPATHY, LUMBAR REGION: ICD-10-CM

## 2022-04-26 PROCEDURE — 72100 X-RAY EXAM L-S SPINE 2/3 VWS: CPT

## 2022-04-26 PROCEDURE — 73502 X-RAY EXAM HIP UNI 2-3 VIEWS: CPT | Mod: RT

## 2022-04-26 PROCEDURE — 99203 OFFICE O/P NEW LOW 30 MIN: CPT

## 2022-04-26 PROCEDURE — 99204 OFFICE O/P NEW MOD 45 MIN: CPT

## 2022-04-26 NOTE — PHYSICAL EXAM
[5___] : adduction 5[unfilled]/5 [2+] : posterior tibialis pulse: 2+ [Right] : right hip [AP] : anteroposterior [Lateral] : lateral [] : non-antalgic [FreeTextEntry9] : questionable nondisplaced femoral neck fracture. [TWNoteComboBox7] : flexion 110 degrees [de-identified] : extension 20 degrees [de-identified] : adduction 25 degrees [de-identified] : abduction 20 degrees [de-identified] : external rotation 30 degrees

## 2022-04-26 NOTE — HISTORY OF PRESENT ILLNESS
[Sudden] : sudden [10] : 10 [Dull/Aching] : dull/aching [Radiating] : radiating [Sharp] : sharp [Constant] : constant [Household chores] : household chores [Leisure] : leisure [Work] : work [Sleep] : sleep [Nothing helps with pain getting better] : Nothing helps with pain getting better [de-identified] : patient fell on the 17th and a few days later she started experiencing pain in her right hip [] : no [FreeTextEntry1] : right hip  [FreeTextEntry3] : 4/17/22

## 2022-04-26 NOTE — ASSESSMENT
[FreeTextEntry1] : She is referred for a STAT MRI of the right hip to evaluate for fracture.  She is given an injection of toradol 60 mg IM today.  She is given crutches, partial weight bearing RLE.  \par will f/u with Dr Olsen in 1 week for her back if hip MRI is negative.

## 2022-04-27 ENCOUNTER — APPOINTMENT (OUTPATIENT)
Dept: MRI IMAGING | Facility: CLINIC | Age: 72
End: 2022-04-27

## 2022-04-27 ENCOUNTER — RX RENEWAL (OUTPATIENT)
Age: 72
End: 2022-04-27

## 2022-05-06 ENCOUNTER — APPOINTMENT (OUTPATIENT)
Dept: ORTHOPEDIC SURGERY | Facility: CLINIC | Age: 72
End: 2022-05-06

## 2022-05-17 ENCOUNTER — APPOINTMENT (OUTPATIENT)
Dept: ORTHOPEDIC SURGERY | Facility: CLINIC | Age: 72
End: 2022-05-17

## 2022-08-30 LAB
25(OH)D3 SERPL-MCNC: 47.9 NG/ML
ALBUMIN SERPL ELPH-MCNC: 4.2 G/DL
ALP BLD-CCNC: 62 U/L
ALT SERPL-CCNC: 20 U/L
ANION GAP SERPL CALC-SCNC: 11 MMOL/L
AST SERPL-CCNC: 19 U/L
BASOPHILS # BLD AUTO: 0.02 K/UL
BASOPHILS NFR BLD AUTO: 0.4 %
BILIRUB SERPL-MCNC: 0.5 MG/DL
BUN SERPL-MCNC: 17 MG/DL
CALCIUM SERPL-MCNC: 9.4 MG/DL
CHLORIDE SERPL-SCNC: 109 MMOL/L
CHOLEST SERPL-MCNC: 172 MG/DL
CO2 SERPL-SCNC: 23 MMOL/L
COVID-19 NUCLEOCAPSID  GAM ANTIBODY INTERPRETATION: NEGATIVE
COVID-19 SPIKE DOMAIN ANTIBODY INTERPRETATION: POSITIVE
CREAT SERPL-MCNC: 0.86 MG/DL
CREAT SPEC-SCNC: 78 MG/DL
EGFR: 71 ML/MIN/1.73M2
EOSINOPHIL # BLD AUTO: 0.12 K/UL
EOSINOPHIL NFR BLD AUTO: 2.2 %
ESTIMATED AVERAGE GLUCOSE: 131 MG/DL
FOLATE SERPL-MCNC: >20 NG/ML
GLUCOSE SERPL-MCNC: 123 MG/DL
HBA1C MFR BLD HPLC: 6.2 %
HCT VFR BLD CALC: 37.5 %
HDLC SERPL-MCNC: 54 MG/DL
HGB BLD-MCNC: 12.4 G/DL
IMM GRANULOCYTES NFR BLD AUTO: 0.2 %
LDLC SERPL CALC-MCNC: 104 MG/DL
LYMPHOCYTES # BLD AUTO: 2.23 K/UL
LYMPHOCYTES NFR BLD AUTO: 40.3 %
MAN DIFF?: NORMAL
MCHC RBC-ENTMCNC: 29.6 PG
MCHC RBC-ENTMCNC: 33.1 GM/DL
MCV RBC AUTO: 89.5 FL
MICROALBUMIN 24H UR DL<=1MG/L-MCNC: <1.2 MG/DL
MICROALBUMIN/CREAT 24H UR-RTO: NORMAL MG/G
MONOCYTES # BLD AUTO: 0.44 K/UL
MONOCYTES NFR BLD AUTO: 8 %
NEUTROPHILS # BLD AUTO: 2.71 K/UL
NEUTROPHILS NFR BLD AUTO: 48.9 %
NONHDLC SERPL-MCNC: 118 MG/DL
PLATELET # BLD AUTO: 211 K/UL
POTASSIUM SERPL-SCNC: 4.5 MMOL/L
PROT SERPL-MCNC: 6.4 G/DL
RBC # BLD: 4.19 M/UL
RBC # FLD: 13.7 %
SARS-COV-2 AB SERPL IA-ACNC: 88.3 U/ML
SARS-COV-2 AB SERPL QL IA: 0.85 INDEX
SODIUM SERPL-SCNC: 142 MMOL/L
T3 SERPL-MCNC: 168 NG/DL
T4 FREE SERPL-MCNC: 1.5 NG/DL
TRIGL SERPL-MCNC: 71 MG/DL
TSH SERPL-ACNC: 0.01 UIU/ML
VIT B12 SERPL-MCNC: 577 PG/ML
WBC # FLD AUTO: 5.53 K/UL

## 2022-08-31 LAB
TSH RECEPTOR AB: <1.1 IU/L
TSI ACT/NOR SER: <0.1 IU/L

## 2022-09-01 ENCOUNTER — APPOINTMENT (OUTPATIENT)
Dept: ENDOCRINOLOGY | Facility: CLINIC | Age: 72
End: 2022-09-01

## 2022-09-01 VITALS
HEIGHT: 67 IN | SYSTOLIC BLOOD PRESSURE: 120 MMHG | WEIGHT: 184 LBS | BODY MASS INDEX: 28.88 KG/M2 | HEART RATE: 100 BPM | OXYGEN SATURATION: 96 % | DIASTOLIC BLOOD PRESSURE: 78 MMHG

## 2022-09-01 LAB — GLUCOSE BLDC GLUCOMTR-MCNC: 144

## 2022-09-01 PROCEDURE — 82962 GLUCOSE BLOOD TEST: CPT

## 2022-09-01 PROCEDURE — 99214 OFFICE O/P EST MOD 30 MIN: CPT | Mod: 25

## 2022-09-01 NOTE — HISTORY OF PRESENT ILLNESS
[FreeTextEntry1] : 73 yo female f/u for MNG/ hyperthyroidism\par \par *** Sep 01, 2022 ***\par \par feels ok, trying to lose weight. sugar is higher recently\par stopped MMI about 3 weeks\par a1c- 6.2%, LDL- 104,  TSH- 0.01\par \par \par Thyr US (3/15/22)-multiple bilateral nodules, including dominant right lower pole 1.9 cm, right midpole 1.7 cm right upper pole 1.2 cm nodules, left lower pole 1.2 cm and left midpole 1.9 cm nodules, left superior 1.6 cm nodules.  Most nodules described as stable.\par \par *** Phone visit  Feb 28, 2022 ***\par \par feels well overall. diet is a bit worse\par taking mmi 2.5 mg qd\par DXA (12/8/21)- L2-L4 (-2.1), FN (-2.5)\par \par *** Aug 19, 2021 ***\par \par still  on mmi 2.5 mg qd. post covid fatigue\par otherwise, no new c/o\par denies palpitations\par \par *** Apr 29, 2021 ***\par \par s/p covid  in 2/21. \par feels well, less hair loss. with a post -covid fatigue\par Abd US (1/27/21)-  stable subcm GB polyp\par labs as below\par \par *** Nov 23, 2020 ***\par \par feels well overall. less hair loss- used PRP therapy\par on mmi 2.5 mg qd\par \par *** Jun 16, 2020 ***\par \par lost 20 lbs on the diet. feels well overall \par taking mmi 2.5 mg qd \par Thyr US (12/3/19)- stable b/l multiple thyroid nodules\par \par *** Dec 11, 2019 ***\par \par on mmi alternating 2.5mg and 5 mg\par TSH- 0.2, FT4- 0.9\par a1c- 5.9\par \par Thyr US (12/3/19)- stable multiple b/l nodules, incl RLP 1.9, RMP- 1.7, LLP 1.6\par \par \par *** Sep 11, 2019 ***\par stopped BP meds b/o BP is fine. Taking valerian root, less stres\par LDL- 111, fasting glu- 113\par TSH- 0.11\par FT4- 1.1, T3- 124\par a1c- 6.0 <-- 6.1\par \par feels well  on mmi 2.5mg qd . off inderal. \par \par *** June 06, 2019 ***\par \par taking HCTZ prn only. using valerian root with BP improvement\par *** weight gain/gluid retention on procardia\par *** short-term memory loss on toprol\par ***  cough on benicar/HCT\par TSH-0.1 (missed meds), FT4- 1.3, T3- 155\par \par no nausea on a smaller dose\par \par saw Dr. Ferguson, reports nl work up. \par a1c- 6.1 <--  6.0 <-- 6.0, \par TSH- 0.558 <-- 0.39, FT4- 1.03 <--  1.0, T3-138 <-- 116\par labs (4/24/18)- a1c- 6.0, TSH -0.4, FT4-0.94, T3-103, LDL-128, TC-202, 25D- 36\par \par Thyr US (11/27/18)- multiple b/l nodules, incl RLP 1.8, RMP 1.6, LMP 1.4. All described as stable\par Thyr US (5/1/18)- enlarged thyroid. multiple b/l nodulkes, incl dominant Rt inferior 1.8 complex w/ calc (prev FNA'd); LMP 2.0 complex with calc\par Thyr US (11/2/17)- multiple b/l nodules, incl Rt inferior 1.9cm complex and Lt superior 1.9 w/ calc. Appear stable\par Thyr US (5/1/17)- multiple b/l nodules, stable., incl dominant Rt inf complex 2.0cm; RMP calcified 1.0cm, LMP 1.9cm calcified\par Thyr US (9/20/16)- mutiple b/l nodules. Rt inferior 1.9cm (+ calcif), RMP calcified 1.0cm; LMP 1.8cm calcified. 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 RLP 2.0cm- AUS in a background of lymph thyroiditis (Katia III). \par \par ***Thyroseq- no mutations assoc w/ high risk cancer. TSHR mutation identified (10% freq)- this mutation is assos w/ low risk ca if  freq > 30%. therefore, likely to be a benign nodule\par \par Abd US (5/1/18)- several gallbladder polyps upto 0.4cm\par Abd US- 0.4cm gallbladder polyp\par \par *** intolerant of fosamax (myalgia)\par seeing Dr. Jones (rheum)- for poss prolia vs reclast arranged by him, but is presently on hold b/o dental implant work\par \par saw neuro (dr. Langston)- apparently nl w/up\par \par DXA (5/16/19)- LS (-1.7) with OA changes in L1 (-1.3); L2-L4 (-1.8), RFN (-2.4), radius 33% (-2.1).  FRAX - 13% and 3.1%\par DXA (5/1/17)- LS (-2.1) with L1 (-2.5), Rt FN (-2.5). \par \par *** started fosamax in 11/17, stopped  after few doses\par \par DXA (5/19/15)- FN (-1.9),  LS (-1.7), wrist- (-1.5)\par FRAX - m/op- 11% , hip- 1.8%\par \par \par 24h I-123 U+S (3/31/16)- 24.3%; dominant "cold" region in LMLP lobe. \par \par labs (3/29/16)- TSH- 0.10, FT4- 0.78, T3- 102, + Tg/TPO ab. neg TSI/TBII\par \par Thyr US (12/10/15)- multiple b/l nodules, incl LUP iso vascular 1.6x1.2x10, RMP iso vascular 1.6x1.5x0.9; RLP hypo, avascular 1.9x1.9x1.4\par \par \par \par HPI:\par with history of Hashimoto's thyroiditis, diagnosed in 2000. Initially on synthroid (seen by Dr. De La Vega) for 1-2 years, then switched to Dr. High, and eventually stopped synthroid because of low TSH. Has been followed annually for a "mildly low TSH", but did not require any intervention.\par \par Also, was diagnosed with MNG, recalls a benign FNA of one of the nodule several years ago.\par \par No US/ FNA reports are available.\par \par Reports having a DXA scan done last year, but is not aware of results.\par \par Sister was just diagnosed with a papillary thyroid cancer. Denies history of radiation exposure to head and neck area in a childhood. \par \par Labs (3/7/16)- TSH - 0.06, FT4- 1.07, T3- 111, a1c- 5.9\par labs from 12/15- TSH - 0.078, FT4- 1.09, a1c- 6.0\par \par

## 2022-09-01 NOTE — ASSESSMENT
[Methimazole Therapy] : Risks and benefits of methimazole therapy were discussed with the patient,  including rash, liver dysfunction, and agranulocytosis.  Patient was instructed to call the office for flu-like symptoms eg fever and sore-throat [Diabetes Foot Care] : diabetes foot care [Long Term Vascular Complications] : long term vascular complications of diabetes [Carbohydrate Consistent Diet] : carbohydrate consistent diet [Importance of Diet and Exercise] : importance of diet and exercise to improve glycemic control, achieve weight loss and improve cardiovascular health [Exercise/Effect on Glucose] : exercise/effect on glucose [Hypoglycemia Management] : hypoglycemia management [Glucagon Use] : glucagon use [Action and use of Insulin] : action and use of short and long-acting insulin [Self Monitoring of Blood Glucose] : self monitoring of blood glucose [Injection Technique, Storage, Sharps Disposal] : injection technique, storage, and sharps disposal [Retinopathy Screening] : Patient was referred to ophthalmology for retinopathy screening [FreeTextEntry1] : 1. Toxic MNG\par Given osteopenia and incr risk for AFib- needs to stay on ATD\par - resume mmi 5 mg qd\par - monitor neutrophils\par - prev d/w pt poss total tx - again reviewed risks of AUS and current thyroseq analysis report. Pt declines total tx for now\par - close BP monitoring, cont benicar\par - given nodular stability , will rpt thyr US in 1 year (3/23). prev d/w pt potential reFNA  next year given + FHX and prior AUS pathology\par \par 2. Osteoporosis\par keep off fosamax. we discussed prolia vs reclast (R+B)- per pt, is on hold for now b/o doing dental implants\par - calcium 500 mg bid, add extra OTC vitamin D3 2,000 IU/day\par - continue weight-bearing exercises; advised avoiding high risk sports\par \par 3. Borderline diabetes\par - trial of ozempic 0.25 mg qw and uptitrate\par - again, reviewed need in statins- pt declined at present\par - advised to see GI  (Dr. Rocha) for gallbladder polyp\par - consider hem/onc eval for persistent lymphocytosis\par RTC 4 mos, or sooner prn. labs prior.

## 2022-09-06 ENCOUNTER — RX RENEWAL (OUTPATIENT)
Age: 72
End: 2022-09-06

## 2022-10-03 RX ORDER — BLOOD SUGAR DIAGNOSTIC
STRIP MISCELLANEOUS DAILY
Qty: 1 | Refills: 3 | Status: ACTIVE | COMMUNITY
Start: 2022-10-03 | End: 1900-01-01

## 2022-10-03 RX ORDER — LANCETS
EACH MISCELLANEOUS
Qty: 100 | Refills: 1 | Status: ACTIVE | COMMUNITY
Start: 2022-10-03 | End: 1900-01-01

## 2022-11-11 DIAGNOSIS — U07.1 COVID-19: ICD-10-CM

## 2022-11-22 LAB
ALBUMIN SERPL ELPH-MCNC: 4.2 G/DL
ALP BLD-CCNC: 60 U/L
ALT SERPL-CCNC: 18 U/L
ANION GAP SERPL CALC-SCNC: 12 MMOL/L
AST SERPL-CCNC: 18 U/L
BILIRUB SERPL-MCNC: 0.6 MG/DL
BUN SERPL-MCNC: 17 MG/DL
CALCIUM SERPL-MCNC: 9.5 MG/DL
CHLORIDE SERPL-SCNC: 106 MMOL/L
CHOLEST SERPL-MCNC: 167 MG/DL
CO2 SERPL-SCNC: 24 MMOL/L
COVID-19 NUCLEOCAPSID  GAM ANTIBODY INTERPRETATION: POSITIVE
COVID-19 SPIKE DOMAIN ANTIBODY INTERPRETATION: POSITIVE
CREAT SERPL-MCNC: 0.89 MG/DL
CREAT SPEC-SCNC: 69 MG/DL
EGFR: 68 ML/MIN/1.73M2
ESTIMATED AVERAGE GLUCOSE: 128 MG/DL
FOLATE SERPL-MCNC: 19.1 NG/ML
GLUCOSE SERPL-MCNC: 112 MG/DL
HBA1C MFR BLD HPLC: 6.1 %
HDLC SERPL-MCNC: 52 MG/DL
LDLC SERPL CALC-MCNC: 103 MG/DL
MICROALBUMIN 24H UR DL<=1MG/L-MCNC: <1.2 MG/DL
MICROALBUMIN/CREAT 24H UR-RTO: NORMAL MG/G
NONHDLC SERPL-MCNC: 115 MG/DL
POTASSIUM SERPL-SCNC: 4.5 MMOL/L
PROT SERPL-MCNC: 6.6 G/DL
SARS-COV-2 AB SERPL IA-ACNC: >250 U/ML
SARS-COV-2 AB SERPL QL IA: 86 INDEX
SODIUM SERPL-SCNC: 142 MMOL/L
T4 FREE SERPL-MCNC: 1.3 NG/DL
TRIGL SERPL-MCNC: 56 MG/DL
TSH SERPL-ACNC: 0.02 UIU/ML
VIT B12 SERPL-MCNC: 593 PG/ML

## 2022-12-02 ENCOUNTER — APPOINTMENT (OUTPATIENT)
Dept: ENDOCRINOLOGY | Facility: CLINIC | Age: 72
End: 2022-12-02

## 2022-12-02 VITALS
WEIGHT: 179 LBS | HEART RATE: 73 BPM | BODY MASS INDEX: 28.09 KG/M2 | OXYGEN SATURATION: 100 % | SYSTOLIC BLOOD PRESSURE: 130 MMHG | TEMPERATURE: 97.4 F | RESPIRATION RATE: 16 BRPM | DIASTOLIC BLOOD PRESSURE: 70 MMHG | HEIGHT: 67 IN

## 2022-12-02 DIAGNOSIS — K82.4 CHOLESTEROLOSIS OF GALLBLADDER: ICD-10-CM

## 2022-12-02 LAB — GLUCOSE BLDC GLUCOMTR-MCNC: 95

## 2022-12-02 PROCEDURE — 99214 OFFICE O/P EST MOD 30 MIN: CPT | Mod: 25

## 2022-12-02 PROCEDURE — 82962 GLUCOSE BLOOD TEST: CPT

## 2022-12-02 RX ORDER — BLOOD-GLUCOSE SENSOR
EACH MISCELLANEOUS
Qty: 6 | Refills: 2 | Status: ACTIVE | COMMUNITY
Start: 2022-12-02 | End: 1900-01-01

## 2022-12-02 NOTE — ASSESSMENT
[Diabetes Foot Care] : diabetes foot care [Long Term Vascular Complications] : long term vascular complications of diabetes [Importance of Diet and Exercise] : importance of diet and exercise to improve glycemic control, achieve weight loss and improve cardiovascular health [Exercise/Effect on Glucose] : exercise/effect on glucose [Hypoglycemia Management] : hypoglycemia management [Glucagon Use] : glucagon use [Action and use of Insulin] : action and use of short and long-acting insulin [Self Monitoring of Blood Glucose] : self monitoring of blood glucose [Injection Technique, Storage, Sharps Disposal] : injection technique, storage, and sharps disposal [Retinopathy Screening] : Patient was referred to ophthalmology for retinopathy screening [Carbohydrate Consistent Diet] : carbohydrate consistent diet [Methimazole Therapy] : Risks and benefits of methimazole therapy were discussed with the patient,  including rash, liver dysfunction, and agranulocytosis.  Patient was instructed to call the office for flu-like symptoms eg fever and sore-throat [FreeTextEntry1] : 1. Toxic MNG\par Given osteopenia and incr risk for AFib- needs to stay on ATD\par - mmi 5 mg qd and recheck in 2 months\par - monitor neutrophils\par - prev d/w pt poss total tx - again reviewed risks of AUS and current thyroseq analysis report. Pt declines total tx for now\par - close BP monitoring, cont benicar\par - given nodular stability , will rpt thyr US in 1 year (3/23). prev d/w pt potential reFNA  next year given + FHX and prior AUS pathology\par \par 2. Osteoporosis\par - keep off fosamax. we discussed prolia vs reclast (R+B)- per pt, is on hold for now b/o doing dental implants\par - calcium 500 mg bid, add extra OTC vitamin D3 2,000 IU/day\par - continue weight-bearing exercises; advised avoiding high risk sports\par \par 3. Borderline diabetes\par - cont  ozempic 0.25 mg qw and uptitrate\par - again, reviewed need in statins- pt declined at present\par - advised to see GI  (Dr. Rocha) for gallbladder polyp\par - consider hem/onc eval for persistent lymphocytosis\par RTC 3 mos, or sooner prn. labs prior.

## 2022-12-02 NOTE — HISTORY OF PRESENT ILLNESS
[FreeTextEntry1] : 71 yo female f/u for MNG/ hyperthyroidism\par \par *** Dec 02, 2022 ***\par \par s/p covid, did not use paxlovid. recovered well\par resumed Ozempic 0.25 mg qw, tolerates this dose well. started back MMI 5 mg qd\par \par \par \par *** Sep 01, 2022 ***\par \par feels ok, trying to lose weight. sugar is higher recently\par stopped MMI about 3 weeks\par a1c- 6.2%, LDL- 104,  TSH- 0.01\par \par \par Thyr US (3/15/22)-multiple bilateral nodules, including dominant right lower pole 1.9 cm, right midpole 1.7 cm right upper pole 1.2 cm nodules, left lower pole 1.2 cm and left midpole 1.9 cm nodules, left superior 1.6 cm nodules.  Most nodules described as stable.\par \par *** Phone visit  Feb 28, 2022 ***\par \par feels well overall. diet is a bit worse\par taking mmi 2.5 mg qd\par DXA (12/8/21)- L2-L4 (-2.1), FN (-2.5)\par \par *** Aug 19, 2021 ***\par \par still  on mmi 2.5 mg qd. post covid fatigue\par otherwise, no new c/o\par denies palpitations\par \par *** Apr 29, 2021 ***\par \par s/p covid  in 2/21. \par feels well, less hair loss. with a post -covid fatigue\par Abd US (1/27/21)-  stable subcm GB polyp\par labs as below\par \par *** Nov 23, 2020 ***\par \par feels well overall. less hair loss- used PRP therapy\par on mmi 2.5 mg qd\par \par *** Jun 16, 2020 ***\par \par lost 20 lbs on the diet. feels well overall \par taking mmi 2.5 mg qd \par Thyr US (12/3/19)- stable b/l multiple thyroid nodules\par \par *** Dec 11, 2019 ***\par \par on mmi alternating 2.5mg and 5 mg\par TSH- 0.2, FT4- 0.9\par a1c- 5.9\par \par Thyr US (12/3/19)- stable multiple b/l nodules, incl RLP 1.9, RMP- 1.7, LLP 1.6\par \par \par *** Sep 11, 2019 ***\par stopped BP meds b/o BP is fine. Taking valerian root, less stres\par LDL- 111, fasting glu- 113\par TSH- 0.11\par FT4- 1.1, T3- 124\par a1c- 6.0 <-- 6.1\par \par feels well  on mmi 2.5mg qd . off inderal. \par \par *** June 06, 2019 ***\par \par taking HCTZ prn only. using valerian root with BP improvement\par *** weight gain/gluid retention on procardia\par *** short-term memory loss on toprol\par ***  cough on benicar/HCT\par TSH-0.1 (missed meds), FT4- 1.3, T3- 155\par \par no nausea on a smaller dose\par \par saw Dr. Ferguson, reports nl work up. \par a1c- 6.1 <--  6.0 <-- 6.0, \par TSH- 0.558 <-- 0.39, FT4- 1.03 <--  1.0, T3-138 <-- 116\par labs (4/24/18)- a1c- 6.0, TSH -0.4, FT4-0.94, T3-103, LDL-128, TC-202, 25D- 36\par \par Thyr US (11/27/18)- multiple b/l nodules, incl RLP 1.8, RMP 1.6, LMP 1.4. All described as stable\par Thyr US (5/1/18)- enlarged thyroid. multiple b/l nodulkes, incl dominant Rt inferior 1.8 complex w/ calc (prev FNA'd); LMP 2.0 complex with calc\par Thyr US (11/2/17)- multiple b/l nodules, incl Rt inferior 1.9cm complex and Lt superior 1.9 w/ calc. Appear stable\par Thyr US (5/1/17)- multiple b/l nodules, stable., incl dominant Rt inf complex 2.0cm; RMP calcified 1.0cm, LMP 1.9cm calcified\par Thyr US (9/20/16)- mutiple b/l nodules. Rt inferior 1.9cm (+ calcif), RMP calcified 1.0cm; LMP 1.8cm calcified. 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 RLP 2.0cm- AUS in a background of lymph thyroiditis (Katia III). \par \par ***Thyroseq- no mutations assoc w/ high risk cancer. TSHR mutation identified (10% freq)- this mutation is assos w/ low risk ca if  freq > 30%. therefore, likely to be a benign nodule\par \par Abd US (5/1/18)- several gallbladder polyps upto 0.4cm\par Abd US- 0.4cm gallbladder polyp\par \par *** intolerant of fosamax (myalgia)\par seeing Dr. Jones (rheum)- for poss prolia vs reclast arranged by him, but is presently on hold b/o dental implant work\par \par saw neuro (dr. Langston)- apparently nl w/up\par \par DXA (5/16/19)- LS (-1.7) with OA changes in L1 (-1.3); L2-L4 (-1.8), RFN (-2.4), radius 33% (-2.1).  FRAX - 13% and 3.1%\par DXA (5/1/17)- LS (-2.1) with L1 (-2.5), Rt FN (-2.5). \par \par *** started fosamax in 11/17, stopped  after few doses\par \par DXA (5/19/15)- FN (-1.9),  LS (-1.7), wrist- (-1.5)\par FRAX - m/op- 11% , hip- 1.8%\par \par \par 24h I-123 U+S (3/31/16)- 24.3%; dominant "cold" region in LMLP lobe. \par \par labs (3/29/16)- TSH- 0.10, FT4- 0.78, T3- 102, + Tg/TPO ab. neg TSI/TBII\par \par Thyr US (12/10/15)- multiple b/l nodules, incl LUP iso vascular 1.6x1.2x10, RMP iso vascular 1.6x1.5x0.9; RLP hypo, avascular 1.9x1.9x1.4\par \par \par \par HPI:\par with history of Hashimoto's thyroiditis, diagnosed in 2000. Initially on synthroid (seen by Dr. De La Vega) for 1-2 years, then switched to Dr. High, and eventually stopped synthroid because of low TSH. Has been followed annually for a "mildly low TSH", but did not require any intervention.\par \par Also, was diagnosed with MNG, recalls a benign FNA of one of the nodule several years ago.\par \par No US/ FNA reports are available.\par \par Reports having a DXA scan done last year, but is not aware of results.\par \par Sister was just diagnosed with a papillary thyroid cancer. Denies history of radiation exposure to head and neck area in a childhood. \par \par Labs (3/7/16)- TSH - 0.06, FT4- 1.07, T3- 111, a1c- 5.9\par labs from 12/15- TSH - 0.078, FT4- 1.09, a1c- 6.0\par \par

## 2023-03-03 ENCOUNTER — APPOINTMENT (OUTPATIENT)
Dept: ENDOCRINOLOGY | Facility: CLINIC | Age: 73
End: 2023-03-03

## 2023-03-15 ENCOUNTER — APPOINTMENT (OUTPATIENT)
Dept: ULTRASOUND IMAGING | Facility: CLINIC | Age: 73
End: 2023-03-15

## 2023-04-20 LAB
BASOPHILS # BLD AUTO: 0.03 K/UL
BASOPHILS NFR BLD AUTO: 0.5 %
CREAT SPEC-SCNC: 96 MG/DL
EOSINOPHIL # BLD AUTO: 0.12 K/UL
EOSINOPHIL NFR BLD AUTO: 2 %
ESTIMATED AVERAGE GLUCOSE: 120 MG/DL
FRUCTOSAMINE SERPL-MCNC: 255 UMOL/L
HBA1C MFR BLD HPLC: 5.8 %
HCT VFR BLD CALC: 39 %
HGB BLD-MCNC: 12.7 G/DL
IMM GRANULOCYTES NFR BLD AUTO: 0.2 %
LYMPHOCYTES # BLD AUTO: 2.44 K/UL
LYMPHOCYTES NFR BLD AUTO: 41.6 %
MAN DIFF?: NORMAL
MCHC RBC-ENTMCNC: 30.2 PG
MCHC RBC-ENTMCNC: 32.6 GM/DL
MCV RBC AUTO: 92.6 FL
MICROALBUMIN 24H UR DL<=1MG/L-MCNC: <1.2 MG/DL
MICROALBUMIN/CREAT 24H UR-RTO: NORMAL MG/G
MONOCYTES # BLD AUTO: 0.58 K/UL
MONOCYTES NFR BLD AUTO: 9.9 %
NEUTROPHILS # BLD AUTO: 2.68 K/UL
NEUTROPHILS NFR BLD AUTO: 45.8 %
PLATELET # BLD AUTO: 229 K/UL
RBC # BLD: 4.21 M/UL
RBC # FLD: 14.2 %
WBC # FLD AUTO: 5.86 K/UL

## 2023-04-21 LAB
25(OH)D3 SERPL-MCNC: 49.4 NG/ML
ALBUMIN SERPL ELPH-MCNC: 4.3 G/DL
ALP BLD-CCNC: 74 U/L
ALT SERPL-CCNC: 27 U/L
ANION GAP SERPL CALC-SCNC: 11 MMOL/L
AST SERPL-CCNC: 28 U/L
BILIRUB SERPL-MCNC: 0.6 MG/DL
BUN SERPL-MCNC: 18 MG/DL
CALCIUM SERPL-MCNC: 9.6 MG/DL
CHLORIDE SERPL-SCNC: 105 MMOL/L
CHOLEST SERPL-MCNC: 198 MG/DL
CO2 SERPL-SCNC: 25 MMOL/L
CREAT SERPL-MCNC: 0.98 MG/DL
EGFR: 60 ML/MIN/1.73M2
FOLATE SERPL-MCNC: >20 NG/ML
GLUCOSE SERPL-MCNC: 104 MG/DL
HDLC SERPL-MCNC: 78 MG/DL
LDLC SERPL CALC-MCNC: 111 MG/DL
NONHDLC SERPL-MCNC: 120 MG/DL
POTASSIUM SERPL-SCNC: 4.5 MMOL/L
PROT SERPL-MCNC: 6.9 G/DL
SODIUM SERPL-SCNC: 142 MMOL/L
T3 SERPL-MCNC: 122 NG/DL
T4 FREE SERPL-MCNC: 1 NG/DL
TRIGL SERPL-MCNC: 44 MG/DL
TSH SERPL-ACNC: 0.25 UIU/ML
VIT B12 SERPL-MCNC: 519 PG/ML

## 2023-04-24 ENCOUNTER — APPOINTMENT (OUTPATIENT)
Dept: ENDOCRINOLOGY | Facility: CLINIC | Age: 73
End: 2023-04-24
Payer: MEDICARE

## 2023-04-24 VITALS
BODY MASS INDEX: 27.47 KG/M2 | WEIGHT: 175 LBS | SYSTOLIC BLOOD PRESSURE: 142 MMHG | OXYGEN SATURATION: 100 % | RESPIRATION RATE: 16 BRPM | HEART RATE: 78 BPM | TEMPERATURE: 98 F | HEIGHT: 67 IN | DIASTOLIC BLOOD PRESSURE: 70 MMHG

## 2023-04-24 LAB — GLUCOSE BLDC GLUCOMTR-MCNC: 85

## 2023-04-24 PROCEDURE — 82962 GLUCOSE BLOOD TEST: CPT

## 2023-04-24 PROCEDURE — 99214 OFFICE O/P EST MOD 30 MIN: CPT | Mod: 25

## 2023-04-24 NOTE — ASSESSMENT
[Diabetes Foot Care] : diabetes foot care [Long Term Vascular Complications] : long term vascular complications of diabetes [Carbohydrate Consistent Diet] : carbohydrate consistent diet [Importance of Diet and Exercise] : importance of diet and exercise to improve glycemic control, achieve weight loss and improve cardiovascular health [Exercise/Effect on Glucose] : exercise/effect on glucose [Hypoglycemia Management] : hypoglycemia management [Glucagon Use] : glucagon use [Action and use of Insulin] : action and use of short and long-acting insulin [Self Monitoring of Blood Glucose] : self monitoring of blood glucose [Injection Technique, Storage, Sharps Disposal] : injection technique, storage, and sharps disposal [Retinopathy Screening] : Patient was referred to ophthalmology for retinopathy screening [FreeTextEntry1] : 1. Toxic MNG\par Given osteopenia and incr risk for AFib- needs to stay on ATD\par - mmi 5 mg qd \par - monitor neutrophils\par - prev d/w pt poss total tx - again reviewed risks of AUS and current thyroseq analysis report. Pt declines total tx for now\par - close BP monitoring, cont benicar\par - given nodular stability , will rpt thyr US in 1 year (3/24). prev d/w pt potential reFNA  next year given + FHX and prior AUS pathology\par \par 2. Osteoporosis\par - keep off fosamax. we discussed prolia vs reclast (R+B)- per pt, is on hold for now b/o doing dental implants\par - calcium 500 mg bid, add extra OTC vitamin D3 2,000 IU/day\par - continue weight-bearing exercises; advised avoiding high risk sports\par \par 3. Borderline diabetes\par - cont  ozempic 0.25 mg qw and uptitrate\par - again, reviewed need in statins- pt declined at present\par - advised to see GI  (Dr. Rocha) for gallbladder polyp\par - consider hem/onc eval for persistent lymphocytosis\par RTC 4 mos, or sooner prn. labs prior.

## 2023-04-24 NOTE — HISTORY OF PRESENT ILLNESS
[FreeTextEntry1] : 71 yo female f/u for MNG/ hyperthyroidism\par \par *** Apr 24, 2023 ***\par \par taking Ozempic 0.25 mg qw, tolerates well\par resumed MMi 5 mg qd\par \par Thyr US (3/29/23) - multiple b/l thyroid nodules, incl dominant RMP 1.7 and RLP 1.9 cm, LLP 1.6, LMP 2.0 cm, left superior 1.5 cm. All are stable \par Abd US (3/29/23)- stable gallbladder polyp\par \par *** Dec 02, 2022 ***\par \par s/p covid, did not use paxlovid. recovered well\par resumed Ozempic 0.25 mg qw, tolerates this dose well. started back MMI 5 mg qd\par \par \par \par *** Sep 01, 2022 ***\par \par feels ok, trying to lose weight. sugar is higher recently\par stopped MMI about 3 weeks\par a1c- 6.2%, LDL- 104,  TSH- 0.01\par \par \par Thyr US (3/15/22)-multiple bilateral nodules, including dominant right lower pole 1.9 cm, right midpole 1.7 cm right upper pole 1.2 cm nodules, left lower pole 1.2 cm and left midpole 1.9 cm nodules, left superior 1.6 cm nodules.  Most nodules described as stable.\par \par *** Phone visit  Feb 28, 2022 ***\par \par feels well overall. diet is a bit worse\par taking mmi 2.5 mg qd\par DXA (12/8/21)- L2-L4 (-2.1), FN (-2.5)\par \par *** Aug 19, 2021 ***\par \par still  on mmi 2.5 mg qd. post covid fatigue\par otherwise, no new c/o\par denies palpitations\par \par *** Apr 29, 2021 ***\par \par s/p covid  in 2/21. \par feels well, less hair loss. with a post -covid fatigue\par Abd US (1/27/21)-  stable subcm GB polyp\par labs as below\par \par *** Nov 23, 2020 ***\par \par feels well overall. less hair loss- used PRP therapy\par on mmi 2.5 mg qd\par \par *** Jun 16, 2020 ***\par \par lost 20 lbs on the diet. feels well overall \par taking mmi 2.5 mg qd \par Thyr US (12/3/19)- stable b/l multiple thyroid nodules\par \par *** Dec 11, 2019 ***\par \par on mmi alternating 2.5mg and 5 mg\par TSH- 0.2, FT4- 0.9\par a1c- 5.9\par \par Thyr US (12/3/19)- stable multiple b/l nodules, incl RLP 1.9, RMP- 1.7, LLP 1.6\par \par \par *** Sep 11, 2019 ***\par stopped BP meds b/o BP is fine. Taking valerian root, less stres\par LDL- 111, fasting glu- 113\par TSH- 0.11\par FT4- 1.1, T3- 124\par a1c- 6.0 <-- 6.1\par \par feels well  on mmi 2.5mg qd . off inderal. \par \par *** June 06, 2019 ***\par \par taking HCTZ prn only. using valerian root with BP improvement\par *** weight gain/gluid retention on procardia\par *** short-term memory loss on toprol\par ***  cough on benicar/HCT\par TSH-0.1 (missed meds), FT4- 1.3, T3- 155\par \par no nausea on a smaller dose\par \par saw Dr. Ferguson, reports nl work up. \par a1c- 6.1 <--  6.0 <-- 6.0, \par TSH- 0.558 <-- 0.39, FT4- 1.03 <--  1.0, T3-138 <-- 116\par labs (4/24/18)- a1c- 6.0, TSH -0.4, FT4-0.94, T3-103, LDL-128, TC-202, 25D- 36\par \par Thyr US (11/27/18)- multiple b/l nodules, incl RLP 1.8, RMP 1.6, LMP 1.4. All described as stable\par Thyr US (5/1/18)- enlarged thyroid. multiple b/l nodulkes, incl dominant Rt inferior 1.8 complex w/ calc (prev FNA'd); LMP 2.0 complex with calc\par Thyr US (11/2/17)- multiple b/l nodules, incl Rt inferior 1.9cm complex and Lt superior 1.9 w/ calc. Appear stable\par Thyr US (5/1/17)- multiple b/l nodules, stable., incl dominant Rt inf complex 2.0cm; RMP calcified 1.0cm, LMP 1.9cm calcified\par Thyr US (9/20/16)- mutiple b/l nodules. Rt inferior 1.9cm (+ calcif), RMP calcified 1.0cm; LMP 1.8cm calcified. 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 RLP 2.0cm- AUS in a background of lymph thyroiditis (Katia III). \par \par ***Thyroseq- no mutations assoc w/ high risk cancer. TSHR mutation identified (10% freq)- this mutation is assos w/ low risk ca if  freq > 30%. therefore, likely to be a benign nodule\par \par Abd US (5/1/18)- several gallbladder polyps upto 0.4cm\par Abd US- 0.4cm gallbladder polyp\par \par *** intolerant of fosamax (myalgia)\par seeing Dr. Jones (rheum)- for poss prolia vs reclast arranged by him, but is presently on hold b/o dental implant work\par \par saw neuro (dr. Langston)- apparently nl w/up\par \par DXA (5/16/19)- LS (-1.7) with OA changes in L1 (-1.3); L2-L4 (-1.8), RFN (-2.4), radius 33% (-2.1).  FRAX - 13% and 3.1%\par DXA (5/1/17)- LS (-2.1) with L1 (-2.5), Rt FN (-2.5). \par \par *** started fosamax in 11/17, stopped  after few doses\par \par DXA (5/19/15)- FN (-1.9),  LS (-1.7), wrist- (-1.5)\par FRAX - m/op- 11% , hip- 1.8%\par \par \par 24h I-123 U+S (3/31/16)- 24.3%; dominant "cold" region in LMLP lobe. \par \par labs (3/29/16)- TSH- 0.10, FT4- 0.78, T3- 102, + Tg/TPO ab. neg TSI/TBII\par \par Thyr US (12/10/15)- multiple b/l nodules, incl LUP iso vascular 1.6x1.2x10, RMP iso vascular 1.6x1.5x0.9; RLP hypo, avascular 1.9x1.9x1.4\par \par \par \par HPI:\par with history of Hashimoto's thyroiditis, diagnosed in 2000. Initially on synthroid (seen by Dr. De La Vega) for 1-2 years, then switched to Dr. High, and eventually stopped synthroid because of low TSH. Has been followed annually for a "mildly low TSH", but did not require any intervention.\par \par Also, was diagnosed with MNG, recalls a benign FNA of one of the nodule several years ago.\par \par No US/ FNA reports are available.\par \par Reports having a DXA scan done last year, but is not aware of results.\par \par Sister was just diagnosed with a papillary thyroid cancer. Denies history of radiation exposure to head and neck area in a childhood. \par \par Labs (3/7/16)- TSH - 0.06, FT4- 1.07, T3- 111, a1c- 5.9\par labs from 12/15- TSH - 0.078, FT4- 1.09, a1c- 6.0\par \par

## 2023-05-08 ENCOUNTER — RX RENEWAL (OUTPATIENT)
Age: 73
End: 2023-05-08

## 2023-07-28 ENCOUNTER — APPOINTMENT (OUTPATIENT)
Dept: ENDOCRINOLOGY | Facility: CLINIC | Age: 73
End: 2023-07-28
Payer: MEDICARE

## 2023-07-28 VITALS
RESPIRATION RATE: 16 BRPM | TEMPERATURE: 98 F | OXYGEN SATURATION: 99 % | HEART RATE: 68 BPM | BODY MASS INDEX: 28.25 KG/M2 | HEIGHT: 67 IN | SYSTOLIC BLOOD PRESSURE: 122 MMHG | WEIGHT: 180 LBS | DIASTOLIC BLOOD PRESSURE: 62 MMHG

## 2023-07-28 DIAGNOSIS — I10 ESSENTIAL (PRIMARY) HYPERTENSION: ICD-10-CM

## 2023-07-28 LAB — GLUCOSE BLDC GLUCOMTR-MCNC: 107

## 2023-07-28 PROCEDURE — 99214 OFFICE O/P EST MOD 30 MIN: CPT | Mod: 25

## 2023-07-28 PROCEDURE — 82962 GLUCOSE BLOOD TEST: CPT

## 2023-07-28 NOTE — ASSESSMENT
[Diabetes Foot Care] : diabetes foot care [Long Term Vascular Complications] : long term vascular complications of diabetes [Carbohydrate Consistent Diet] : carbohydrate consistent diet [Importance of Diet and Exercise] : importance of diet and exercise to improve glycemic control, achieve weight loss and improve cardiovascular health [Exercise/Effect on Glucose] : exercise/effect on glucose [Hypoglycemia Management] : hypoglycemia management [Glucagon Use] : glucagon use [Action and use of Insulin] : action and use of short and long-acting insulin [Self Monitoring of Blood Glucose] : self monitoring of blood glucose [Injection Technique, Storage, Sharps Disposal] : injection technique, storage, and sharps disposal [Retinopathy Screening] : Patient was referred to ophthalmology for retinopathy screening [FreeTextEntry1] : 1. Toxic MNG\par Given osteopenia and incr risk for AFib- needs to stay on ATD\par - can increase mmi 7.5 mg qd \par - monitor neutrophils\par - prev d/w pt poss total tx - again reviewed risks of AUS and current thyroseq analysis report. Pt declines total tx for now\par - close BP monitoring, cont benicar\par - given nodular stability , will rpt thyr US in 1 year (3/24). prev d/w pt potential reFNA  next year given + FHX and prior AUS pathology\par \par 2. Osteoporosis\par - keep off fosamax. we discussed prolia vs reclast (R+B)- per pt, is on hold for now b/o doing dental implants\par - calcium 500 mg bid, add extra OTC vitamin D3 2,000 IU/day\par - continue weight-bearing exercises; advised avoiding high risk sports\par \par 3. Borderline diabetes\par - cont  ozempic 0.5 mg qw and uptitrate\par - again, reviewed need in statins- pt declined at present\par - advised to see GI  (Dr. Rocha) for gallbladder polyp\par - consider hem/onc eval for persistent lymphocytosis\par RTC 4 mos, or sooner prn. labs prior.

## 2023-07-28 NOTE — HISTORY OF PRESENT ILLNESS
[FreeTextEntry1] : 71 yo female f/u for MNG/ hyperthyroidism\par \par *** Jul 28, 2023 ***\par \par regained some weight\par increased Ozempic 0.5 mg qw, tolerates well so far, MMI 5 mg qd\par TSH- 0.15, FT4- 1.1, a1c- 5.8, LDL- 121\par \par *** Apr 24, 2023 ***\par \par taking Ozempic 0.25 mg qw, tolerates well\par resumed MMi 5 mg qd\par \par Thyr US (3/29/23) - multiple b/l thyroid nodules, incl dominant RMP 1.7 and RLP 1.9 cm, LLP 1.6, LMP 2.0 cm, left superior 1.5 cm. All are stable \par Abd US (3/29/23)- stable gallbladder polyp\par \par *** Dec 02, 2022 ***\par \par s/p covid, did not use paxlovid. recovered well\par resumed Ozempic 0.25 mg qw, tolerates this dose well. started back MMI 5 mg qd\par \par \par \par *** Sep 01, 2022 ***\par \par feels ok, trying to lose weight. sugar is higher recently\par stopped MMI about 3 weeks\par a1c- 6.2%, LDL- 104,  TSH- 0.01\par \par \par Thyr US (3/15/22)-multiple bilateral nodules, including dominant right lower pole 1.9 cm, right midpole 1.7 cm right upper pole 1.2 cm nodules, left lower pole 1.2 cm and left midpole 1.9 cm nodules, left superior 1.6 cm nodules.  Most nodules described as stable.\par \par *** Phone visit  Feb 28, 2022 ***\par \par feels well overall. diet is a bit worse\par taking mmi 2.5 mg qd\par DXA (12/8/21)- L2-L4 (-2.1), FN (-2.5)\par \par *** Aug 19, 2021 ***\par \par still  on mmi 2.5 mg qd. post covid fatigue\par otherwise, no new c/o\par denies palpitations\par \par *** Apr 29, 2021 ***\par \par s/p covid  in 2/21. \par feels well, less hair loss. with a post -covid fatigue\par Abd US (1/27/21)-  stable subcm GB polyp\par labs as below\par \par *** Nov 23, 2020 ***\par \par feels well overall. less hair loss- used PRP therapy\par on mmi 2.5 mg qd\par \par *** Jun 16, 2020 ***\par \par lost 20 lbs on the diet. feels well overall \par taking mmi 2.5 mg qd \par Thyr US (12/3/19)- stable b/l multiple thyroid nodules\par \par *** Dec 11, 2019 ***\par \par on mmi alternating 2.5mg and 5 mg\par TSH- 0.2, FT4- 0.9\par a1c- 5.9\par \par Thyr US (12/3/19)- stable multiple b/l nodules, incl RLP 1.9, RMP- 1.7, LLP 1.6\par \par \par *** Sep 11, 2019 ***\par stopped BP meds b/o BP is fine. Taking valerian root, less stres\par LDL- 111, fasting glu- 113\par TSH- 0.11\par FT4- 1.1, T3- 124\par a1c- 6.0 <-- 6.1\par \par feels well  on mmi 2.5mg qd . off inderal. \par \par *** June 06, 2019 ***\par \par taking HCTZ prn only. using valerian root with BP improvement\par *** weight gain/gluid retention on procardia\par *** short-term memory loss on toprol\par ***  cough on benicar/HCT\par TSH-0.1 (missed meds), FT4- 1.3, T3- 155\par \par no nausea on a smaller dose\par \par saw Dr. Zisfein, reports nl work up. \par a1c- 6.1 <--  6.0 <-- 6.0, \par TSH- 0.558 <-- 0.39, FT4- 1.03 <--  1.0, T3-138 <-- 116\par labs (4/24/18)- a1c- 6.0, TSH -0.4, FT4-0.94, T3-103, LDL-128, TC-202, 25D- 36\par \par Thyr US (11/27/18)- multiple b/l nodules, incl RLP 1.8, RMP 1.6, LMP 1.4. All described as stable\par Thyr US (5/1/18)- enlarged thyroid. multiple b/l nodulkes, incl dominant Rt inferior 1.8 complex w/ calc (prev FNA'd); LMP 2.0 complex with calc\par Thyr US (11/2/17)- multiple b/l nodules, incl Rt inferior 1.9cm complex and Lt superior 1.9 w/ calc. Appear stable\par Thyr US (5/1/17)- multiple b/l nodules, stable., incl dominant Rt inf complex 2.0cm; RMP calcified 1.0cm, LMP 1.9cm calcified\par Thyr US (9/20/16)- mutiple b/l nodules. Rt inferior 1.9cm (+ calcif), RMP calcified 1.0cm; LMP 1.8cm calcified. 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 RLP 2.0cm- AUS in a background of lymph thyroiditis (Katia III). \par \par ***Thyroseq- no mutations assoc w/ high risk cancer. TSHR mutation identified (10% freq)- this mutation is assos w/ low risk ca if  freq > 30%. therefore, likely to be a benign nodule\par \par Abd US (5/1/18)- several gallbladder polyps upto 0.4cm\par Abd US- 0.4cm gallbladder polyp\par \par *** intolerant of fosamax (myalgia)\par seeing Dr. Jones (rheum)- for poss prolia vs reclast arranged by him, but is presently on hold b/o dental implant work\par \par saw neuro (dr. Langston)- apparently nl w/up\par \par DXA (5/16/19)- LS (-1.7) with OA changes in L1 (-1.3); L2-L4 (-1.8), RFN (-2.4), radius 33% (-2.1).  FRAX - 13% and 3.1%\par DXA (5/1/17)- LS (-2.1) with L1 (-2.5), Rt FN (-2.5). \par \par *** started fosamax in 11/17, stopped  after few doses\par \par DXA (5/19/15)- FN (-1.9),  LS (-1.7), wrist- (-1.5)\par FRAX - m/op- 11% , hip- 1.8%\par \par \par 24h I-123 U+S (3/31/16)- 24.3%; dominant "cold" region in LMLP lobe. \par \par labs (3/29/16)- TSH- 0.10, FT4- 0.78, T3- 102, + Tg/TPO ab. neg TSI/TBII\par \par Thyr US (12/10/15)- multiple b/l nodules, incl LUP iso vascular 1.6x1.2x10, RMP iso vascular 1.6x1.5x0.9; RLP hypo, avascular 1.9x1.9x1.4\par \par \par \par HPI:\par with history of Hashimoto's thyroiditis, diagnosed in 2000. Initially on synthroid (seen by Dr. De La Vega) for 1-2 years, then switched to Dr. High, and eventually stopped synthroid because of low TSH. Has been followed annually for a "mildly low TSH", but did not require any intervention.\par \par Also, was diagnosed with MNG, recalls a benign FNA of one of the nodule several years ago.\par \par No US/ FNA reports are available.\par \par Reports having a DXA scan done last year, but is not aware of results.\par \par Sister was just diagnosed with a papillary thyroid cancer. Denies history of radiation exposure to head and neck area in a childhood. \par \par Labs (3/7/16)- TSH - 0.06, FT4- 1.07, T3- 111, a1c- 5.9\par labs from 12/15- TSH - 0.078, FT4- 1.09, a1c- 6.0\par \par

## 2023-08-13 ENCOUNTER — RX RENEWAL (OUTPATIENT)
Age: 73
End: 2023-08-13

## 2023-09-21 ENCOUNTER — NON-APPOINTMENT (OUTPATIENT)
Age: 73
End: 2023-09-21

## 2023-09-29 LAB
25(OH)D3 SERPL-MCNC: 44.4 NG/ML
ALBUMIN SERPL ELPH-MCNC: 4.3 G/DL
ALP BLD-CCNC: 68 U/L
ALT SERPL-CCNC: 19 U/L
ANION GAP SERPL CALC-SCNC: 10 MMOL/L
AST SERPL-CCNC: 24 U/L
BILIRUB SERPL-MCNC: 0.6 MG/DL
BUN SERPL-MCNC: 16 MG/DL
CALCIUM SERPL-MCNC: 9.2 MG/DL
CHLORIDE SERPL-SCNC: 104 MMOL/L
CHOLEST SERPL-MCNC: 190 MG/DL
CO2 SERPL-SCNC: 25 MMOL/L
CREAT SERPL-MCNC: 0.91 MG/DL
EGFR: 66 ML/MIN/1.73M2
FOLATE SERPL-MCNC: 17.4 NG/ML
GLUCOSE SERPL-MCNC: 96 MG/DL
HDLC SERPL-MCNC: 66 MG/DL
LDLC SERPL CALC-MCNC: 113 MG/DL
NONHDLC SERPL-MCNC: 124 MG/DL
POTASSIUM SERPL-SCNC: 4.3 MMOL/L
PROT SERPL-MCNC: 6.6 G/DL
SODIUM SERPL-SCNC: 140 MMOL/L
T3 SERPL-MCNC: 145 NG/DL
T4 FREE SERPL-MCNC: 1.2 NG/DL
TRIGL SERPL-MCNC: 58 MG/DL
TSH SERPL-ACNC: 0.11 UIU/ML
VIT B12 SERPL-MCNC: 518 PG/ML

## 2023-10-06 ENCOUNTER — TRANSCRIPTION ENCOUNTER (OUTPATIENT)
Age: 73
End: 2023-10-06

## 2023-10-06 ENCOUNTER — NON-APPOINTMENT (OUTPATIENT)
Age: 73
End: 2023-10-06

## 2023-10-06 LAB
CREAT SPEC-SCNC: 79 MG/DL
ESTIMATED AVERAGE GLUCOSE: 126 MG/DL
HBA1C MFR BLD HPLC: 6 %
MICROALBUMIN 24H UR DL<=1MG/L-MCNC: <1.2 MG/DL
MICROALBUMIN/CREAT 24H UR-RTO: NORMAL MG/G

## 2023-10-06 RX ORDER — HYDROCHLOROTHIAZIDE 12.5 MG/1
12.5 CAPSULE ORAL DAILY
Qty: 90 | Refills: 3 | Status: ACTIVE | COMMUNITY
Start: 2019-06-06 | End: 1900-01-01

## 2023-10-06 RX ORDER — NIRMATRELVIR AND RITONAVIR 300-100 MG
20 X 150 MG & KIT ORAL
Qty: 1 | Refills: 0 | Status: DISCONTINUED | COMMUNITY
Start: 2022-11-11 | End: 2023-10-06

## 2023-10-23 ENCOUNTER — RX RENEWAL (OUTPATIENT)
Age: 73
End: 2023-10-23

## 2023-11-09 ENCOUNTER — APPOINTMENT (OUTPATIENT)
Dept: ORTHOPEDIC SURGERY | Facility: CLINIC | Age: 73
End: 2023-11-09
Payer: MEDICARE

## 2023-11-09 VITALS — WEIGHT: 180 LBS | HEIGHT: 67 IN | BODY MASS INDEX: 28.25 KG/M2

## 2023-11-09 DIAGNOSIS — S70.01XD CONTUSION OF RIGHT HIP, SUBSEQUENT ENCOUNTER: ICD-10-CM

## 2023-11-09 PROCEDURE — 99204 OFFICE O/P NEW MOD 45 MIN: CPT

## 2023-11-09 PROCEDURE — 72170 X-RAY EXAM OF PELVIS: CPT

## 2023-11-09 PROCEDURE — 72100 X-RAY EXAM L-S SPINE 2/3 VWS: CPT

## 2023-11-09 RX ORDER — MELOXICAM 15 MG/1
15 TABLET ORAL
Qty: 30 | Refills: 2 | Status: ACTIVE | COMMUNITY
Start: 2023-11-09 | End: 1900-01-01

## 2023-12-05 LAB
25(OH)D3 SERPL-MCNC: 43.5 NG/ML
ALBUMIN SERPL ELPH-MCNC: 4.2 G/DL
ALP BLD-CCNC: 73 U/L
ALT SERPL-CCNC: 19 U/L
ANION GAP SERPL CALC-SCNC: 10 MMOL/L
AST SERPL-CCNC: 19 U/L
BASOPHILS # BLD AUTO: 0.02 K/UL
BASOPHILS NFR BLD AUTO: 0.3 %
BILIRUB SERPL-MCNC: 0.6 MG/DL
BUN SERPL-MCNC: 19 MG/DL
CALCIUM SERPL-MCNC: 9.6 MG/DL
CHLORIDE SERPL-SCNC: 105 MMOL/L
CHOLEST SERPL-MCNC: 195 MG/DL
CO2 SERPL-SCNC: 27 MMOL/L
CREAT SERPL-MCNC: 0.93 MG/DL
CREAT SPEC-SCNC: 57 MG/DL
EGFR: 64 ML/MIN/1.73M2
EOSINOPHIL # BLD AUTO: 0.18 K/UL
EOSINOPHIL NFR BLD AUTO: 3.1 %
FOLATE SERPL-MCNC: >20 NG/ML
FRUCTOSAMINE SERPL-MCNC: 261 UMOL/L
GLUCOSE SERPL-MCNC: 110 MG/DL
GLYCOMARK.: 23.5 UG/ML
HCT VFR BLD CALC: 38.2 %
HDLC SERPL-MCNC: 72 MG/DL
HGB BLD-MCNC: 12.2 G/DL
IMM GRANULOCYTES NFR BLD AUTO: 0.2 %
LDLC SERPL CALC-MCNC: 110 MG/DL
LYMPHOCYTES # BLD AUTO: 2.24 K/UL
LYMPHOCYTES NFR BLD AUTO: 38.2 %
MAN DIFF?: NORMAL
MCHC RBC-ENTMCNC: 29.8 PG
MCHC RBC-ENTMCNC: 31.9 GM/DL
MCV RBC AUTO: 93.4 FL
MICROALBUMIN 24H UR DL<=1MG/L-MCNC: <1.2 MG/DL
MICROALBUMIN/CREAT 24H UR-RTO: NORMAL MG/G
MONOCYTES # BLD AUTO: 0.52 K/UL
MONOCYTES NFR BLD AUTO: 8.9 %
NEUTROPHILS # BLD AUTO: 2.9 K/UL
NEUTROPHILS NFR BLD AUTO: 49.3 %
NONHDLC SERPL-MCNC: 122 MG/DL
PLATELET # BLD AUTO: 226 K/UL
POTASSIUM SERPL-SCNC: 4.6 MMOL/L
PROT SERPL-MCNC: 6.8 G/DL
RBC # BLD: 4.09 M/UL
RBC # FLD: 14 %
SODIUM SERPL-SCNC: 142 MMOL/L
T4 FREE SERPL-MCNC: 1.2 NG/DL
TRIGL SERPL-MCNC: 66 MG/DL
TSH SERPL-ACNC: 0.11 UIU/ML
VIT B12 SERPL-MCNC: 536 PG/ML
WBC # FLD AUTO: 5.87 K/UL

## 2023-12-11 LAB
ESTIMATED AVERAGE GLUCOSE: 126 MG/DL
HBA1C MFR BLD HPLC: 6 %

## 2023-12-13 ENCOUNTER — APPOINTMENT (OUTPATIENT)
Dept: ENDOCRINOLOGY | Facility: CLINIC | Age: 73
End: 2023-12-13
Payer: MEDICARE

## 2023-12-13 VITALS
RESPIRATION RATE: 16 BRPM | BODY MASS INDEX: 28.41 KG/M2 | HEIGHT: 67 IN | TEMPERATURE: 98.2 F | DIASTOLIC BLOOD PRESSURE: 81 MMHG | OXYGEN SATURATION: 100 % | WEIGHT: 181 LBS | SYSTOLIC BLOOD PRESSURE: 134 MMHG | HEART RATE: 86 BPM

## 2023-12-13 DIAGNOSIS — E11.9 TYPE 2 DIABETES MELLITUS W/OUT COMPLICATIONS: ICD-10-CM

## 2023-12-13 LAB — GLUCOSE BLDC GLUCOMTR-MCNC: 115

## 2023-12-13 PROCEDURE — 99214 OFFICE O/P EST MOD 30 MIN: CPT | Mod: 25

## 2023-12-13 PROCEDURE — 82962 GLUCOSE BLOOD TEST: CPT

## 2023-12-13 RX ORDER — SEMAGLUTIDE 0.68 MG/ML
2 INJECTION, SOLUTION SUBCUTANEOUS
Qty: 1 | Refills: 5 | Status: ACTIVE | COMMUNITY
Start: 2022-09-01 | End: 1900-01-01

## 2023-12-13 NOTE — HISTORY OF PRESENT ILLNESS
[FreeTextEntry1] : 74 yo female f/u for MNG/ hyperthyroidism  *** Dec 13, 2023 ***  s/p recent fall (tripped), but no fracture. on and off dizziness- plans to see neurologist stopped ozempic d/t the cost on MMI 5 mg qd (did not increase the dose yet) saw cardio (Dr. Ferguson) - added aldactone 25 mg 1/2 tab qd  DXA(12/11/23)- LS (-1.6), LFN (-2.2), RFN (-1.7). FRAX- 14% and 3.8% DXA (12/8/21)- L2-L4 (-2.1), FN (-2.5)  *** Jul 28, 2023 ***  regained some weight increased Ozempic 0.5 mg qw, tolerates well so far, MMI 5 mg qd TSH- 0.15, FT4- 1.1, a1c- 5.8, LDL- 121  *** Apr 24, 2023 ***  taking Ozempic 0.25 mg qw, tolerates well resumed MMi 5 mg qd  Thyr US (3/29/23) - multiple b/l thyroid nodules, incl dominant RMP 1.7 and RLP 1.9 cm, LLP 1.6, LMP 2.0 cm, left superior 1.5 cm. All are stable  Abd US (3/29/23)- stable gallbladder polyp  *** Dec 02, 2022 ***  s/p covid, did not use paxlovid. recovered well resumed Ozempic 0.25 mg qw, tolerates this dose well. started back MMI 5 mg qd   *** Sep 01, 2022 ***  feels ok, trying to lose weight. sugar is higher recently stopped MMI about 3 weeks a1c- 6.2%, LDL- 104,  TSH- 0.01   Thyr US (3/15/22)-multiple bilateral nodules, including dominant right lower pole 1.9 cm, right midpole 1.7 cm right upper pole 1.2 cm nodules, left lower pole 1.2 cm and left midpole 1.9 cm nodules, left superior 1.6 cm nodules.  Most nodules described as stable.  *** Phone visit  Feb 28, 2022 ***  feels well overall. diet is a bit worse taking mmi 2.5 mg qd DXA (12/8/21)- L2-L4 (-2.1), FN (-2.5)  *** Aug 19, 2021 ***  still  on mmi 2.5 mg qd. post covid fatigue otherwise, no new c/o denies palpitations  *** Apr 29, 2021 ***  s/p covid  in 2/21.  feels well, less hair loss. with a post -covid fatigue Abd US (1/27/21)-  stable subcm GB polyp labs as below  *** Nov 23, 2020 ***  feels well overall. less hair loss- used PRP therapy on mmi 2.5 mg qd  *** Jun 16, 2020 ***  lost 20 lbs on the diet. feels well overall  taking mmi 2.5 mg qd  Thyr US (12/3/19)- stable b/l multiple thyroid nodules  *** Dec 11, 2019 ***  on mmi alternating 2.5mg and 5 mg TSH- 0.2, FT4- 0.9 a1c- 5.9  Thyr US (12/3/19)- stable multiple b/l nodules, incl RLP 1.9, RMP- 1.7, LLP 1.6   *** Sep 11, 2019 *** stopped BP meds b/o BP is fine. Taking valerian root, less stres LDL- 111, fasting glu- 113 TSH- 0.11 FT4- 1.1, T3- 124 a1c- 6.0 <-- 6.1  feels well  on mmi 2.5mg qd . off inderal.   *** June 06, 2019 ***  taking HCTZ prn only. using valerian root with BP improvement *** weight gain/gluid retention on procardia *** short-term memory loss on toprol ***  cough on benicar/HCT TSH-0.1 (missed meds), FT4- 1.3, T3- 155  no nausea on a smaller dose  saw Dr. Ferguson, reports nl work up.  a1c- 6.1 <--  6.0 <-- 6.0,  TSH- 0.558 <-- 0.39, FT4- 1.03 <--  1.0, T3-138 <-- 116 labs (4/24/18)- a1c- 6.0, TSH -0.4, FT4-0.94, T3-103, LDL-128, TC-202, 25D- 36  Thyr US (11/27/18)- multiple b/l nodules, incl RLP 1.8, RMP 1.6, LMP 1.4. All described as stable Thyr US (5/1/18)- enlarged thyroid. multiple b/l nodulkes, incl dominant Rt inferior 1.8 complex w/ calc (prev FNA'd); LMP 2.0 complex with calc Thyr US (11/2/17)- multiple b/l nodules, incl Rt inferior 1.9cm complex and Lt superior 1.9 w/ calc. Appear stable Thyr US (5/1/17)- multiple b/l nodules, stable., incl dominant Rt inf complex 2.0cm; RMP calcified 1.0cm, LMP 1.9cm calcified Thyr US (9/20/16)- mutiple b/l nodules. Rt inferior 1.9cm (+ calcif), RMP calcified 1.0cm; LMP 1.8cm calcified. 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 RLP 2.0cm- AUS in a background of lymph thyroiditis (Katia III).   ***Thyroseq- no mutations assoc w/ high risk cancer. TSHR mutation identified (10% freq)- this mutation is assos w/ low risk ca if  freq > 30%. therefore, likely to be a benign nodule  Abd US (5/1/18)- several gallbladder polyps upto 0.4cm Abd US- 0.4cm gallbladder polyp  *** intolerant of fosamax (myalgia) seeing Dr. Jones (rheum)- for poss prolia vs reclast arranged by him, but is presently on hold b/o dental implant work  saw neuro (dr. Langston)- apparently nl w/up  DXA (5/16/19)- LS (-1.7) with OA changes in L1 (-1.3); L2-L4 (-1.8), RFN (-2.4), radius 33% (-2.1).  FRAX - 13% and 3.1% DXA (5/1/17)- LS (-2.1) with L1 (-2.5), Rt FN (-2.5).   *** started fosamax in 11/17, stopped  after few doses  DXA (5/19/15)- FN (-1.9),  LS (-1.7), wrist- (-1.5) FRAX - m/op- 11% , hip- 1.8%   24h I-123 U+S (3/31/16)- 24.3%; dominant "cold" region in LMLP lobe.   labs (3/29/16)- TSH- 0.10, FT4- 0.78, T3- 102, + Tg/TPO ab. neg TSI/TBII  Thyr US (12/10/15)- multiple b/l nodules, incl LUP iso vascular 1.6x1.2x10, RMP iso vascular 1.6x1.5x0.9; RLP hypo, avascular 1.9x1.9x1.4    HPI: with history of Hashimoto's thyroiditis, diagnosed in 2000. Initially on synthroid (seen by Dr. De La Vega) for 1-2 years, then switched to Dr. High, and eventually stopped synthroid because of low TSH. Has been followed annually for a "mildly low TSH", but did not require any intervention.  Also, was diagnosed with MNG, recalls a benign FNA of one of the nodule several years ago.  No US/ FNA reports are available.  Reports having a DXA scan done last year, but is not aware of results.  Sister was just diagnosed with a papillary thyroid cancer. Denies history of radiation exposure to head and neck area in a childhood.   Labs (3/7/16)- TSH - 0.06, FT4- 1.07, T3- 111, a1c- 5.9 labs from 12/15- TSH - 0.078, FT4- 1.09, a1c- 6.0

## 2023-12-13 NOTE — ASSESSMENT
[Diabetes Foot Care] : diabetes foot care [Long Term Vascular Complications] : long term vascular complications of diabetes [Carbohydrate Consistent Diet] : carbohydrate consistent diet [Importance of Diet and Exercise] : importance of diet and exercise to improve glycemic control, achieve weight loss and improve cardiovascular health [Exercise/Effect on Glucose] : exercise/effect on glucose [Hypoglycemia Management] : hypoglycemia management [Glucagon Use] : glucagon use [Action and use of Insulin] : action and use of short and long-acting insulin [Self Monitoring of Blood Glucose] : self monitoring of blood glucose [Injection Technique, Storage, Sharps Disposal] : injection technique, storage, and sharps disposal [Retinopathy Screening] : Patient was referred to ophthalmology for retinopathy screening [FreeTextEntry1] : 1. Toxic MNG Given osteopenia and incr risk for AFib- needs to stay on ATD - increase mmi 7.5 mg qd - monitor neutrophils - prev d/w pt poss total tx - again reviewed risks of AUS and current thyroseq analysis report. Pt declines total tx for now - close BP monitoring, cont benicar - given nodular stability , will rpt thyr US in 1 year (3/24). prev d/w pt potential reFNA next year given + FHX and prior AUS pathology  2. Osteoporosis - keep off fosamax. we discussed prolia vs reclast (R+B)- per pt, is on hold for now b/o doing dental implants, and pt declined at present - calcium 500 mg bid, add extra OTC vitamin D3 2,000 IU/day - continue weight-bearing exercises; advised avoiding high risk sports  3. Borderline diabetes - resume ozempic 0.5 mg qw and uptitrate - again, reviewed need in statins- pt declined at present - advised to see GI (Dr. Rocha) for gallbladder polyp - consider hem/onc eval for persistent lymphocytosis  RTC 4 mos, or sooner prn. labs prior.

## 2024-04-17 ENCOUNTER — APPOINTMENT (OUTPATIENT)
Dept: ENDOCRINOLOGY | Facility: CLINIC | Age: 74
End: 2024-04-17
Payer: MEDICARE

## 2024-04-17 VITALS
HEIGHT: 67 IN | HEART RATE: 83 BPM | DIASTOLIC BLOOD PRESSURE: 75 MMHG | RESPIRATION RATE: 16 BRPM | OXYGEN SATURATION: 97 % | WEIGHT: 180 LBS | BODY MASS INDEX: 28.25 KG/M2 | SYSTOLIC BLOOD PRESSURE: 136 MMHG

## 2024-04-17 LAB — GLUCOSE BLDC GLUCOMTR-MCNC: 102

## 2024-04-17 PROCEDURE — G2211 COMPLEX E/M VISIT ADD ON: CPT

## 2024-04-17 PROCEDURE — 99214 OFFICE O/P EST MOD 30 MIN: CPT

## 2024-04-17 NOTE — HISTORY OF PRESENT ILLNESS
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 on and off dizziness- plans to see neurologist stopped ozempic d/t the cost on MMI 5 mg qd (did not increase the dose yet) saw cardio (Dr. Ferguson) - added aldactone 25 mg 1/2 tab qd  DXA(12/11/23)- LS (-1.6), LFN (-2.2), RFN (-1.7). FRAX- 14% and 3.8% DXA (12/8/21)- L2-L4 (-2.1), FN (-2.5)  *** Jul 28, 2023 ***  regained some weight increased Ozempic 0.5 mg qw, tolerates well so far, MMI 5 mg qd TSH- 0.15, FT4- 1.1, a1c- 5.8, LDL- 121  *** Apr 24, 2023 ***  taking Ozempic 0.25 mg qw, tolerates well resumed MMi 5 mg qd  Thyr US (3/29/23) - multiple b/l thyroid nodules, incl dominant RMP 1.7 and RLP 1.9 cm, LLP 1.6, LMP 2.0 cm, left superior 1.5 cm. All are stable  Abd US (3/29/23)- stable gallbladder polyp  *** Dec 02, 2022 ***  s/p covid, did not use paxlovid. recovered well resumed Ozempic 0.25 mg qw, tolerates this dose well. started back MMI 5 mg qd   *** Sep 01, 2022 ***  feels ok, trying to lose weight. sugar is higher recently stopped MMI about 3 weeks a1c- 6.2%, LDL- 104,  TSH- 0.01   Thyr US (3/15/22)-multiple bilateral nodules, including dominant right lower pole 1.9 cm, right midpole 1.7 cm right upper pole 1.2 cm nodules, left lower pole 1.2 cm and left midpole 1.9 cm nodules, left superior 1.6 cm nodules.  Most nodules described as stable.  *** Phone visit  Feb 28, 2022 ***  feels well overall. diet is a bit worse taking mmi 2.5 mg qd DXA (12/8/21)- L2-L4 (-2.1), FN (-2.5)  *** Aug 19, 2021 ***  still  on mmi 2.5 mg qd. post covid fatigue otherwise, no new c/o denies palpitations  *** Apr 29, 2021 ***  s/p covid  in 2/21.  feels well, less hair loss. with a post -covid fatigue Abd US (1/27/21)-  stable subcm GB polyp labs as below  *** Nov 23, 2020 ***  feels well overall. less hair loss- used PRP therapy on mmi 2.5 mg qd  *** Jun 16, 2020 ***  lost 20 lbs on the diet. feels well overall  taking mmi 2.5 mg qd  Thyr US (12/3/19)- stable b/l multiple thyroid nodules  *** Dec 11, 2019 ***  on mmi alternating 2.5mg and 5 mg TSH- 0.2, FT4- 0.9 a1c- 5.9  Thyr US (12/3/19)- stable multiple b/l nodules, incl RLP 1.9, RMP- 1.7, LLP 1.6   *** Sep 11, 2019 *** stopped BP meds b/o BP is fine. Taking valerian root, less stres LDL- 111, fasting glu- 113 TSH- 0.11 FT4- 1.1, T3- 124 a1c- 6.0 <-- 6.1  feels well  on mmi 2.5mg qd . off inderal.   *** June 06, 2019 ***  taking HCTZ prn only. using valerian root with BP improvement *** weight gain/gluid retention on procardia *** short-term memory loss on toprol ***  cough on benicar/HCT TSH-0.1 (missed meds), FT4- 1.3, T3- 155  no nausea on a smaller dose  saw Dr. Ferguson, reports nl work up.  a1c- 6.1 <--  6.0 <-- 6.0,  TSH- 0.558 <-- 0.39, FT4- 1.03 <--  1.0, T3-138 <-- 116 labs (4/24/18)- a1c- 6.0, TSH -0.4, FT4-0.94, T3-103, LDL-128, TC-202, 25D- 36  Thyr US (11/27/18)- multiple b/l nodules, incl RLP 1.8, RMP 1.6, LMP 1.4. All described as stable Thyr US (5/1/18)- enlarged thyroid. multiple b/l nodulkes, incl dominant Rt inferior 1.8 complex w/ calc (prev FNA'd); LMP 2.0 complex with calc Thyr US (11/2/17)- multiple b/l nodules, incl Rt inferior 1.9cm complex and Lt superior 1.9 w/ calc. Appear stable Thyr US (5/1/17)- multiple b/l nodules, stable., incl dominant Rt inf complex 2.0cm; RMP calcified 1.0cm, LMP 1.9cm calcified Thyr US (9/20/16)- mutiple b/l nodules. Rt inferior 1.9cm (+ calcif), RMP calcified 1.0cm; LMP 1.8cm calcified. 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 RLP 2.0cm- AUS in a background of lymph thyroiditis (Katia III).   ***Thyroseq- no mutations assoc w/ high risk cancer. TSHR mutation identified (10% freq)- this mutation is assos w/ low risk ca if  freq > 30%. therefore, likely to be a benign nodule  Abd US (5/1/18)- several gallbladder polyps upto 0.4cm Abd US- 0.4cm gallbladder polyp  *** intolerant of fosamax (myalgia) seeing Dr. Jones (rheum)- for poss prolia vs reclast arranged by him, but is presently on hold b/o dental implant work  saw neuro (dr. Langston)- apparently nl w/up  DXA (5/16/19)- LS (-1.7) with OA changes in L1 (-1.3); L2-L4 (-1.8), RFN (-2.4), radius 33% (-2.1).  FRAX - 13% and 3.1% DXA (5/1/17)- LS (-2.1) with L1 (-2.5), Rt FN (-2.5).   *** started fosamax in 11/17, stopped  after few doses  DXA (5/19/15)- FN (-1.9),  LS (-1.7), wrist- (-1.5) FRAX - m/op- 11% , hip- 1.8%   24h I-123 U+S (3/31/16)- 24.3%; dominant "cold" region in LMLP lobe.   labs (3/29/16)- TSH- 0.10, FT4- 0.78, T3- 102, + Tg/TPO ab. neg TSI/TBII  Thyr US (12/10/15)- multiple b/l nodules, incl LUP iso vascular 1.6x1.2x10, RMP iso vascular 1.6x1.5x0.9; RLP hypo, avascular 1.9x1.9x1.4    HPI: with history of Hashimoto's thyroiditis, diagnosed in 2000. Initially on synthroid (seen by Dr. De La Vega) for 1-2 years, then switched to Dr. High, and eventually stopped synthroid because of low TSH. Has been followed annually for a "mildly low TSH", but did not require any intervention.  Also, was diagnosed with MNG, recalls a benign FNA of one of the nodule several years ago.  No US/ FNA reports are available.  Reports having a DXA scan done last year, but is not aware of results.  Sister was just diagnosed with a papillary thyroid cancer. Denies history of radiation exposure to head and neck area in a childhood.   Labs (3/7/16)- TSH - 0.06, FT4- 1.07, T3- 111, a1c- 5.9 labs from 12/15- TSH - 0.078, FT4- 1.09, a1c- 6.0

## 2024-04-17 NOTE — ASSESSMENT
[Diabetes Foot Care] : diabetes foot care [Long Term Vascular Complications] : long term vascular complications of diabetes [Carbohydrate Consistent Diet] : carbohydrate consistent diet [Importance of Diet and Exercise] : importance of diet and exercise to improve glycemic control, achieve weight loss and improve cardiovascular health [Exercise/Effect on Glucose] : exercise/effect on glucose [Hypoglycemia Management] : hypoglycemia management [Glucagon Use] : glucagon use [Action and use of Insulin] : action and use of short and long-acting insulin [Self Monitoring of Blood Glucose] : self monitoring of blood glucose [Injection Technique, Storage, Sharps Disposal] : injection technique, storage, and sharps disposal [Retinopathy Screening] : Patient was referred to ophthalmology for retinopathy screening [FreeTextEntry1] : 1. Toxic MNG Given osteopenia and incr risk for AFib- needs to stay on ATD - mmi 7.5 mg qd, pending results - monitor neutrophils - prev d/w pt poss total tx - again reviewed risks of AUS and current thyroseq analysis report. Pt declines total tx for now - close BP monitoring, cont benicar - given nodular stability , will rpt thyr US in 1 year (3/25). prev d/w pt potential reFNA next year given + FHX and prior AUS pathology  2. Osteoporosis - keep off fosamax. we discussed prolia vs reclast (R+B)- per pt, is on hold for now b/o doing dental implants, and pt declined at present - calcium 500 mg bid, add extra OTC vitamin D3 2,000 IU/day - continue weight-bearing exercises; advised avoiding high risk sports  3. Borderline diabetes - ozempic 0.25 mg qw and uptitrate - consider switching to Mounjaro - again, reviewed need in statins- pt declined at present - advised to see GI (Dr. Rocha) for gallbladder polyp - consider hem/onc eval for persistent lymphocytosis - for PET this week- consider bx with a thoracicsx.   RTC 4 mos, or sooner prn. labs prior.

## 2024-04-19 LAB
25(OH)D3 SERPL-MCNC: 45 NG/ML
ALBUMIN SERPL ELPH-MCNC: 4.3 G/DL
ALP BLD-CCNC: 70 U/L
ALT SERPL-CCNC: 22 U/L
ANION GAP SERPL CALC-SCNC: 10 MMOL/L
AST SERPL-CCNC: 24 U/L
BILIRUB SERPL-MCNC: 0.6 MG/DL
BUN SERPL-MCNC: 23 MG/DL
CALCIUM SERPL-MCNC: 9.5 MG/DL
CHLORIDE SERPL-SCNC: 105 MMOL/L
CHOLEST SERPL-MCNC: 191 MG/DL
CO2 SERPL-SCNC: 26 MMOL/L
CREAT SERPL-MCNC: 0.97 MG/DL
EGFR: 61 ML/MIN/1.73M2
FOLATE SERPL-MCNC: >20 NG/ML
GLUCOSE SERPL-MCNC: 112 MG/DL
HDLC SERPL-MCNC: 71 MG/DL
LDLC SERPL CALC-MCNC: 110 MG/DL
NONHDLC SERPL-MCNC: 120 MG/DL
POTASSIUM SERPL-SCNC: 4.4 MMOL/L
PROT SERPL-MCNC: 7 G/DL
SODIUM SERPL-SCNC: 141 MMOL/L
T3 SERPL-MCNC: 141 NG/DL
T4 FREE SERPL-MCNC: 1.3 NG/DL
TRIGL SERPL-MCNC: 56 MG/DL
TSH SERPL-ACNC: 0.08 UIU/ML
VIT B12 SERPL-MCNC: 564 PG/ML

## 2024-04-25 LAB
CREAT SPEC-SCNC: 73 MG/DL
ESTIMATED AVERAGE GLUCOSE: 120 MG/DL
HBA1C MFR BLD HPLC: 5.8 %
MICROALBUMIN 24H UR DL<=1MG/L-MCNC: <1.2 MG/DL
MICROALBUMIN/CREAT 24H UR-RTO: NORMAL MG/G

## 2024-05-15 LAB
25(OH)D3 SERPL-MCNC: 54.6 NG/ML
ALBUMIN SERPL ELPH-MCNC: 4.2 G/DL
ALP BLD-CCNC: 70 U/L
ALT SERPL-CCNC: 23 U/L
ANION GAP SERPL CALC-SCNC: 11 MMOL/L
AST SERPL-CCNC: 25 U/L
BASOPHILS # BLD AUTO: 0.02 K/UL
BASOPHILS NFR BLD AUTO: 0.4 %
BILIRUB SERPL-MCNC: 0.4 MG/DL
BUN SERPL-MCNC: 17 MG/DL
CALCIUM SERPL-MCNC: 9 MG/DL
CHLORIDE SERPL-SCNC: 106 MMOL/L
CHOLEST SERPL-MCNC: 206 MG/DL
CO2 SERPL-SCNC: 24 MMOL/L
CREAT SERPL-MCNC: 0.97 MG/DL
CREAT SPEC-SCNC: 80 MG/DL
EGFR: 61 ML/MIN/1.73M2
EOSINOPHIL # BLD AUTO: 0.13 K/UL
EOSINOPHIL NFR BLD AUTO: 2.4 %
FOLATE SERPL-MCNC: >20 NG/ML
GLUCOSE SERPL-MCNC: 95 MG/DL
HCT VFR BLD CALC: 36.9 %
HDLC SERPL-MCNC: 75 MG/DL
HGB BLD-MCNC: 11.9 G/DL
IMM GRANULOCYTES NFR BLD AUTO: 0.2 %
LDLC SERPL CALC-MCNC: 121 MG/DL
LYMPHOCYTES # BLD AUTO: 2.09 K/UL
LYMPHOCYTES NFR BLD AUTO: 37.9 %
MAN DIFF?: NORMAL
MCHC RBC-ENTMCNC: 29.8 PG
MCHC RBC-ENTMCNC: 32.2 GM/DL
MCV RBC AUTO: 92.3 FL
MICROALBUMIN 24H UR DL<=1MG/L-MCNC: <1.2 MG/DL
MICROALBUMIN/CREAT 24H UR-RTO: NORMAL MG/G
MONOCYTES # BLD AUTO: 0.46 K/UL
MONOCYTES NFR BLD AUTO: 8.3 %
NEUTROPHILS # BLD AUTO: 2.8 K/UL
NEUTROPHILS NFR BLD AUTO: 50.8 %
NONHDLC SERPL-MCNC: 131 MG/DL
PLATELET # BLD AUTO: 222 K/UL
POTASSIUM SERPL-SCNC: 4.5 MMOL/L
PROT SERPL-MCNC: 6.5 G/DL
RBC # BLD: 4 M/UL
RBC # FLD: 13.6 %
SODIUM SERPL-SCNC: 141 MMOL/L
T3 SERPL-MCNC: 129 NG/DL
T4 FREE SERPL-MCNC: 1 NG/DL
TRIGL SERPL-MCNC: 56 MG/DL
TSH SERPL-ACNC: 0.19 UIU/ML
VIT B12 SERPL-MCNC: 538 PG/ML
WBC # FLD AUTO: 5.51 K/UL

## 2024-05-16 LAB
ESTIMATED AVERAGE GLUCOSE: 120 MG/DL
HBA1C MFR BLD HPLC: 5.8 %

## 2024-05-17 ENCOUNTER — APPOINTMENT (OUTPATIENT)
Dept: ENDOCRINOLOGY | Facility: CLINIC | Age: 74
End: 2024-05-17
Payer: MEDICARE

## 2024-05-17 VITALS
WEIGHT: 183 LBS | DIASTOLIC BLOOD PRESSURE: 84 MMHG | SYSTOLIC BLOOD PRESSURE: 163 MMHG | HEART RATE: 73 BPM | BODY MASS INDEX: 28.72 KG/M2 | OXYGEN SATURATION: 97 % | HEIGHT: 67 IN

## 2024-05-17 VITALS — SYSTOLIC BLOOD PRESSURE: 144 MMHG | DIASTOLIC BLOOD PRESSURE: 77 MMHG

## 2024-05-17 DIAGNOSIS — E05.20 THYROTOXICOSIS WITH TOXIC MULTINODULAR GOITER W/OUT THYROTOXIC CRISIS OR STORM: ICD-10-CM

## 2024-05-17 DIAGNOSIS — E55.9 VITAMIN D DEFICIENCY, UNSPECIFIED: ICD-10-CM

## 2024-05-17 DIAGNOSIS — R73.03 PREDIABETES.: ICD-10-CM

## 2024-05-17 DIAGNOSIS — M85.80 OTHER SPECIFIED DISORDERS OF BONE DENSITY AND STRUCTURE, UNSPECIFIED SITE: ICD-10-CM

## 2024-05-17 DIAGNOSIS — E78.5 HYPERLIPIDEMIA, UNSPECIFIED: ICD-10-CM

## 2024-05-17 LAB — TSI ACT/NOR SER: <0.1 IU/L

## 2024-05-17 PROCEDURE — 99214 OFFICE O/P EST MOD 30 MIN: CPT

## 2024-05-17 PROCEDURE — G2211 COMPLEX E/M VISIT ADD ON: CPT

## 2024-05-17 NOTE — ASSESSMENT
[Diabetes Foot Care] : diabetes foot care [Long Term Vascular Complications] : long term vascular complications of diabetes [Carbohydrate Consistent Diet] : carbohydrate consistent diet [Importance of Diet and Exercise] : importance of diet and exercise to improve glycemic control, achieve weight loss and improve cardiovascular health [Exercise/Effect on Glucose] : exercise/effect on glucose [Hypoglycemia Management] : hypoglycemia management [Glucagon Use] : glucagon use [Action and use of Insulin] : action and use of short and long-acting insulin [Self Monitoring of Blood Glucose] : self monitoring of blood glucose [Injection Technique, Storage, Sharps Disposal] : injection technique, storage, and sharps disposal [Retinopathy Screening] : Patient was referred to ophthalmology for retinopathy screening [FreeTextEntry1] : 1. Toxic MNG Given osteopenia and incr risk for AFib- needs to stay on ATD - increase mmi 10 mg qd, pending results - monitor neutrophils - prev d/w pt poss total tx - again reviewed risks of AUS and current thyroseq analysis report. Pt declines total tx for now - close BP monitoring, cont benicar - f/u FNA report and [place] [facility] [place]  2. Osteoporosis - keep off fosamax. we discussed prolia vs reclast (R+B)- per pt, is on hold for now b/o doing dental implants, and pt declined at present - calcium 500 mg bid, add extra OTC vitamin D3 2,000 IU/day - continue weight-bearing exercises; advised avoiding high risk sports  3. Borderline diabetes - resume ozempic 0.25 mg qw and uptitrate - consider switching to Mounjaro - again, reviewed need in statins- pt declined at present - advised to see GI (Dr. Rocha) for gallbladder polyp - consider hem/onc eval for persistent lymphocytosis - advised on statins  RTC 3 mos, or sooner prn. labs prior.

## 2024-05-17 NOTE — HISTORY OF PRESENT ILLNESS
[FreeTextEntry1] : 73 yo female f/u for multiple medical issues  *** May 17, 2024 ***  s/p FNA with Dr. Kitchen this week- results are pending PET-CT (4/26/24)- b/l hypermetabolic thyroid nodules . 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 on and off dizziness- plans to see neurologist stopped ozempic d/t the cost on MMI 5 mg qd (did not increase the dose yet) saw cardio (Dr. Ferguson) - added aldactone 25 mg 1/2 tab qd  DXA(12/11/23)- LS (-1.6), LFN (-2.2), RFN (-1.7). FRAX- 14% and 3.8% DXA (12/8/21)- L2-L4 (-2.1), FN (-2.5)  *** Jul 28, 2023 ***  regained some weight increased Ozempic 0.5 mg qw, tolerates well so far, MMI 5 mg qd TSH- 0.15, FT4- 1.1, a1c- 5.8, LDL- 121  *** Apr 24, 2023 ***  taking Ozempic 0.25 mg qw, tolerates well resumed MMi 5 mg qd  Thyr US (3/29/23) - multiple b/l thyroid nodules, incl dominant RMP 1.7 and RLP 1.9 cm, LLP 1.6, LMP 2.0 cm, left superior 1.5 cm. All are stable  Abd US (3/29/23)- stable gallbladder polyp  *** Dec 02, 2022 ***  s/p covid, did not use paxlovid. recovered well resumed Ozempic 0.25 mg qw, tolerates this dose well. started back MMI 5 mg qd   *** Sep 01, 2022 ***  feels ok, trying to lose weight. sugar is higher recently stopped MMI about 3 weeks a1c- 6.2%, LDL- 104,  TSH- 0.01   Thyr US (3/15/22)-multiple bilateral nodules, including dominant right lower pole 1.9 cm, right midpole 1.7 cm right upper pole 1.2 cm nodules, left lower pole 1.2 cm and left midpole 1.9 cm nodules, left superior 1.6 cm nodules.  Most nodules described as stable.  *** Phone visit  Feb 28, 2022 ***  feels well overall. diet is a bit worse taking mmi 2.5 mg qd DXA (12/8/21)- L2-L4 (-2.1), FN (-2.5)  *** Aug 19, 2021 ***  still  on mmi 2.5 mg qd. post covid fatigue otherwise, no new c/o denies palpitations  *** Apr 29, 2021 ***  s/p covid  in 2/21.  feels well, less hair loss. with a post -covid fatigue Abd US (1/27/21)-  stable subcm GB polyp labs as below  *** Nov 23, 2020 ***  feels well overall. less hair loss- used PRP therapy on mmi 2.5 mg qd  *** Jun 16, 2020 ***  lost 20 lbs on the diet. feels well overall  taking mmi 2.5 mg qd  Thyr US (12/3/19)- stable b/l multiple thyroid nodules  *** Dec 11, 2019 ***  on mmi alternating 2.5mg and 5 mg TSH- 0.2, FT4- 0.9 a1c- 5.9  Thyr US (12/3/19)- stable multiple b/l nodules, incl RLP 1.9, RMP- 1.7, LLP 1.6   *** Sep 11, 2019 *** stopped BP meds b/o BP is fine. Taking valerian root, less stres LDL- 111, fasting glu- 113 TSH- 0.11 FT4- 1.1, T3- 124 a1c- 6.0 <-- 6.1  feels well  on mmi 2.5mg qd . off inderal.   *** June 06, 2019 ***  taking HCTZ prn only. using valerian root with BP improvement *** weight gain/gluid retention on procardia *** short-term memory loss on toprol ***  cough on benicar/HCT TSH-0.1 (missed meds), FT4- 1.3, T3- 155  no nausea on a smaller dose  saw Dr. Ferguson, reports nl work up.  a1c- 6.1 <--  6.0 <-- 6.0,  TSH- 0.558 <-- 0.39, FT4- 1.03 <--  1.0, T3-138 <-- 116 labs (4/24/18)- a1c- 6.0, TSH -0.4, FT4-0.94, T3-103, LDL-128, TC-202, 25D- 36  Thyr US (11/27/18)- multiple b/l nodules, incl RLP 1.8, RMP 1.6, LMP 1.4. All described as stable Thyr US (5/1/18)- enlarged thyroid. multiple b/l nodulkes, incl dominant Rt inferior 1.8 complex w/ calc (prev FNA'd); LMP 2.0 complex with calc Thyr US (11/2/17)- multiple b/l nodules, incl Rt inferior 1.9cm complex and Lt superior 1.9 w/ calc. Appear stable Thyr US (5/1/17)- multiple b/l nodules, stable., incl dominant Rt inf complex 2.0cm; RMP calcified 1.0cm, LMP 1.9cm calcified Thyr US (9/20/16)- mutiple b/l nodules. Rt inferior 1.9cm (+ calcif), RMP calcified 1.0cm; LMP 1.8cm calcified. 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 RLP 2.0cm- AUS in a background of lymph thyroiditis (Katia III).   ***Thyroseq- no mutations assoc w/ high risk cancer. TSHR mutation identified (10% freq)- this mutation is assos w/ low risk ca if  freq > 30%. therefore, likely to be a benign nodule  Abd US (5/1/18)- several gallbladder polyps upto 0.4cm Abd US- 0.4cm gallbladder polyp  *** intolerant of fosamax (myalgia) seeing Dr. Jones (rheum)- for poss prolia vs reclast arranged by him, but is presently on hold b/o dental implant work  saw neuro (dr. Langston)- apparently nl w/up  DXA (5/16/19)- LS (-1.7) with OA changes in L1 (-1.3); L2-L4 (-1.8), RFN (-2.4), radius 33% (-2.1).  FRAX - 13% and 3.1% DXA (5/1/17)- LS (-2.1) with L1 (-2.5), Rt FN (-2.5).   *** started fosamax in 11/17, stopped  after few doses  DXA (5/19/15)- FN (-1.9),  LS (-1.7), wrist- (-1.5) FRAX - m/op- 11% , hip- 1.8%   24h I-123 U+S (3/31/16)- 24.3%; dominant "cold" region in LMLP lobe.   labs (3/29/16)- TSH- 0.10, FT4- 0.78, T3- 102, + Tg/TPO ab. neg TSI/TBII  Thyr US (12/10/15)- multiple b/l nodules, incl LUP iso vascular 1.6x1.2x10, RMP iso vascular 1.6x1.5x0.9; RLP hypo, avascular 1.9x1.9x1.4    HPI: with history of Hashimoto's thyroiditis, diagnosed in 2000. Initially on synthroid (seen by Dr. De La Vega) for 1-2 years, then switched to Dr. High, and eventually stopped synthroid because of low TSH. Has been followed annually for a "mildly low TSH", but did not require any intervention.  Also, was diagnosed with MNG, recalls a benign FNA of one of the nodule several years ago.  No US/ FNA reports are available.  Reports having a DXA scan done last year, but is not aware of results.  Sister was just diagnosed with a papillary thyroid cancer. Denies history of radiation exposure to head and neck area in a childhood.   Labs (3/7/16)- TSH - 0.06, FT4- 1.07, T3- 111, a1c- 5.9 labs from 12/15- TSH - 0.078, FT4- 1.09, a1c- 6.0

## 2024-05-19 LAB — TSH RECEPTOR AB: <1.1 IU/L

## 2024-05-24 ENCOUNTER — APPOINTMENT (OUTPATIENT)
Dept: ORTHOPEDIC SURGERY | Facility: CLINIC | Age: 74
End: 2024-05-24
Payer: MEDICARE

## 2024-05-24 VITALS
DIASTOLIC BLOOD PRESSURE: 82 MMHG | WEIGHT: 184.31 LBS | SYSTOLIC BLOOD PRESSURE: 142 MMHG | RESPIRATION RATE: 16 BRPM | BODY MASS INDEX: 28.93 KG/M2 | TEMPERATURE: 98 F | HEART RATE: 72 BPM | OXYGEN SATURATION: 94 % | HEIGHT: 67 IN

## 2024-05-24 PROCEDURE — 99214 OFFICE O/P EST MOD 30 MIN: CPT

## 2024-05-24 PROCEDURE — 73030 X-RAY EXAM OF SHOULDER: CPT | Mod: LT

## 2024-05-24 PROCEDURE — 72170 X-RAY EXAM OF PELVIS: CPT

## 2024-06-05 RX ORDER — OLMESARTAN MEDOXOMIL 20 MG/1
20 TABLET, FILM COATED ORAL
Qty: 180 | Refills: 3 | Status: ACTIVE | COMMUNITY
Start: 2019-06-17 | End: 1900-01-01

## 2024-06-05 RX ORDER — ERGOCALCIFEROL 1.25 MG/1
1.25 MG CAPSULE, LIQUID FILLED ORAL
Qty: 13 | Refills: 3 | Status: ACTIVE | COMMUNITY
Start: 2020-01-03 | End: 1900-01-01

## 2024-06-05 RX ORDER — METHIMAZOLE 5 MG/1
5 TABLET ORAL
Qty: 135 | Refills: 2 | Status: ACTIVE | COMMUNITY
Start: 2018-11-18 | End: 1900-01-01

## 2024-06-24 ENCOUNTER — APPOINTMENT (OUTPATIENT)
Dept: ORTHOPEDIC SURGERY | Facility: CLINIC | Age: 74
End: 2024-06-24
Payer: MEDICARE

## 2024-06-24 DIAGNOSIS — M89.9 DISORDER OF BONE, UNSPECIFIED: ICD-10-CM

## 2024-06-24 PROCEDURE — 99213 OFFICE O/P EST LOW 20 MIN: CPT

## 2024-07-03 ENCOUNTER — APPOINTMENT (OUTPATIENT)
Dept: INTERVENTIONAL RADIOLOGY/VASCULAR | Facility: CLINIC | Age: 74
End: 2024-07-03
Payer: MEDICARE

## 2024-07-03 ENCOUNTER — NON-APPOINTMENT (OUTPATIENT)
Age: 74
End: 2024-07-03

## 2024-07-03 VITALS — HEIGHT: 67 IN | WEIGHT: 190 LBS | BODY MASS INDEX: 29.82 KG/M2

## 2024-07-03 DIAGNOSIS — Z78.9 OTHER SPECIFIED HEALTH STATUS: ICD-10-CM

## 2024-07-03 LAB
ANION GAP SERPL CALC-SCNC: 11 MMOL/L
BASOPHILS # BLD AUTO: 0.03 K/UL
BASOPHILS NFR BLD AUTO: 0.4 %
BUN SERPL-MCNC: 22 MG/DL
CALCIUM SERPL-MCNC: 9.1 MG/DL
CHLORIDE SERPL-SCNC: 104 MMOL/L
CO2 SERPL-SCNC: 25 MMOL/L
CREAT SERPL-MCNC: 0.94 MG/DL
EGFR: 63 ML/MIN/1.73M2
EOSINOPHIL # BLD AUTO: 0.07 K/UL
EOSINOPHIL NFR BLD AUTO: 1 %
GLUCOSE SERPL-MCNC: 93 MG/DL
HCT VFR BLD CALC: 37.4 %
HGB BLD-MCNC: 11.7 G/DL
IMM GRANULOCYTES NFR BLD AUTO: 0.3 %
LYMPHOCYTES # BLD AUTO: 2.75 K/UL
LYMPHOCYTES NFR BLD AUTO: 39.7 %
MAN DIFF?: NORMAL
MCHC RBC-ENTMCNC: 29.5 PG
MCHC RBC-ENTMCNC: 31.3 GM/DL
MCV RBC AUTO: 94.4 FL
MONOCYTES # BLD AUTO: 0.58 K/UL
MONOCYTES NFR BLD AUTO: 8.4 %
NEUTROPHILS # BLD AUTO: 3.47 K/UL
NEUTROPHILS NFR BLD AUTO: 50.2 %
PLATELET # BLD AUTO: 224 K/UL
PMV BLD AUTO: 0 /100 WBCS
POTASSIUM SERPL-SCNC: 4.3 MMOL/L
RBC # BLD: 3.96 M/UL
RBC # FLD: 14.4 %
SODIUM SERPL-SCNC: 140 MMOL/L
WBC # FLD AUTO: 6.92 K/UL

## 2024-07-03 PROCEDURE — 99203 OFFICE O/P NEW LOW 30 MIN: CPT

## 2024-07-07 ENCOUNTER — NON-APPOINTMENT (OUTPATIENT)
Age: 74
End: 2024-07-07

## 2024-07-09 ENCOUNTER — RESULT REVIEW (OUTPATIENT)
Age: 74
End: 2024-07-09

## 2024-07-09 ENCOUNTER — OUTPATIENT (OUTPATIENT)
Dept: OUTPATIENT SERVICES | Facility: HOSPITAL | Age: 74
LOS: 1 days | End: 2024-07-09
Payer: COMMERCIAL

## 2024-07-09 VITALS
HEART RATE: 55 BPM | RESPIRATION RATE: 16 BRPM | SYSTOLIC BLOOD PRESSURE: 167 MMHG | DIASTOLIC BLOOD PRESSURE: 64 MMHG | OXYGEN SATURATION: 98 %

## 2024-07-09 VITALS
TEMPERATURE: 98 F | HEART RATE: 57 BPM | SYSTOLIC BLOOD PRESSURE: 145 MMHG | OXYGEN SATURATION: 98 % | DIASTOLIC BLOOD PRESSURE: 93 MMHG | RESPIRATION RATE: 16 BRPM

## 2024-07-09 DIAGNOSIS — M89.9 DISORDER OF BONE, UNSPECIFIED: ICD-10-CM

## 2024-07-09 PROCEDURE — 20225 BONE BIOPSY TROCAR/NDL DEEP: CPT

## 2024-07-09 PROCEDURE — 88307 TISSUE EXAM BY PATHOLOGIST: CPT | Mod: 26

## 2024-07-09 PROCEDURE — 88173 CYTOPATH EVAL FNA REPORT: CPT | Mod: 26

## 2024-07-09 PROCEDURE — 77012 CT SCAN FOR NEEDLE BIOPSY: CPT | Mod: 26

## 2024-07-09 RX ORDER — METHIMAZOLE 10 MG
1 TABLET ORAL
Refills: 0 | DISCHARGE

## 2024-07-09 RX ORDER — SEMAGLUTIDE 1.34 MG/ML
0.5 INJECTION, SOLUTION SUBCUTANEOUS
Refills: 0 | DISCHARGE

## 2024-07-09 RX ORDER — ACETAMINOPHEN 325 MG
1000 TABLET ORAL ONCE
Refills: 0 | Status: COMPLETED | OUTPATIENT
Start: 2024-07-09 | End: 2024-07-09

## 2024-07-09 RX ORDER — VITAMIN E (DL,TOCOPHERYL ACET) 180 MG
5000 CAPSULE ORAL
Refills: 0 | DISCHARGE

## 2024-07-09 RX ORDER — OLMESARTAN MEDOXOMIL 5 MG/1
1 TABLET, FILM COATED ORAL
Refills: 0 | DISCHARGE

## 2024-07-09 RX ADMIN — Medication 400 MILLIGRAM(S): at 17:12

## 2024-07-09 RX ADMIN — Medication 1000 MILLIGRAM(S): at 17:27

## 2024-07-09 NOTE — PROCEDURE NOTE - NSINFORMCONSENT_GEN_A_CORE
Pt notified and verb understanding. Benefits, risks, and possible complications of procedure explained to patient/caregiver who verbalized understanding and gave written consent.

## 2024-07-10 ENCOUNTER — NON-APPOINTMENT (OUTPATIENT)
Age: 74
End: 2024-07-10

## 2024-07-10 ENCOUNTER — APPOINTMENT (OUTPATIENT)
Dept: SURGERY | Facility: CLINIC | Age: 74
End: 2024-07-10
Payer: MEDICARE

## 2024-07-10 VITALS
BODY MASS INDEX: 29.82 KG/M2 | HEIGHT: 67 IN | HEART RATE: 71 BPM | WEIGHT: 190 LBS | SYSTOLIC BLOOD PRESSURE: 126 MMHG | DIASTOLIC BLOOD PRESSURE: 72 MMHG

## 2024-07-10 DIAGNOSIS — E05.20 THYROTOXICOSIS WITH TOXIC MULTINODULAR GOITER W/OUT THYROTOXIC CRISIS OR STORM: ICD-10-CM

## 2024-07-10 DIAGNOSIS — E04.2 NONTOXIC MULTINODULAR GOITER: ICD-10-CM

## 2024-07-10 DIAGNOSIS — E05.90 THYROTOXICOSIS, UNSPECIFIED W/OUT THYROTOXIC CRISIS OR STORM: ICD-10-CM

## 2024-07-10 LAB — NON-GYNECOLOGICAL CYTOLOGY STUDY: SIGNIFICANT CHANGE UP

## 2024-07-10 PROCEDURE — 99204 OFFICE O/P NEW MOD 45 MIN: CPT

## 2024-07-11 DIAGNOSIS — E06.3 AUTOIMMUNE THYROIDITIS: ICD-10-CM

## 2024-07-11 LAB
25(OH)D3 SERPL-MCNC: 46.7 NG/ML
APTT BLD: 32.7 SEC
CALCIUM SERPL-MCNC: 9.4 MG/DL
INR PPP: 0.96 RATIO
PARATHYROID HORMONE INTACT: 40 PG/ML
PT BLD: 11 SEC
THYROGLOB AB SERPL-ACNC: <20 IU/ML
THYROPEROXIDASE AB SERPL IA-ACNC: 1822 IU/ML

## 2024-07-15 ENCOUNTER — APPOINTMENT (OUTPATIENT)
Dept: ORTHOPEDIC SURGERY | Facility: CLINIC | Age: 74
End: 2024-07-15
Payer: MEDICARE

## 2024-07-15 VITALS — WEIGHT: 190 LBS | HEIGHT: 67 IN | BODY MASS INDEX: 29.82 KG/M2

## 2024-07-15 DIAGNOSIS — M89.9 DISORDER OF BONE, UNSPECIFIED: ICD-10-CM

## 2024-07-15 PROCEDURE — 99215 OFFICE O/P EST HI 40 MIN: CPT

## 2024-08-05 ENCOUNTER — RESULT REVIEW (OUTPATIENT)
Age: 74
End: 2024-08-05

## 2024-08-06 ENCOUNTER — RESULT REVIEW (OUTPATIENT)
Age: 74
End: 2024-08-06

## 2024-08-08 ENCOUNTER — APPOINTMENT (OUTPATIENT)
Dept: ENDOCRINOLOGY | Facility: CLINIC | Age: 74
End: 2024-08-08

## 2024-08-08 PROCEDURE — G2211 COMPLEX E/M VISIT ADD ON: CPT

## 2024-08-08 PROCEDURE — 99214 OFFICE O/P EST MOD 30 MIN: CPT

## 2024-08-08 PROCEDURE — 36415 COLL VENOUS BLD VENIPUNCTURE: CPT

## 2024-08-08 NOTE — ASSESSMENT
[Diabetes Foot Care] : diabetes foot care [Long Term Vascular Complications] : long term vascular complications of diabetes [Carbohydrate Consistent Diet] : carbohydrate consistent diet [Importance of Diet and Exercise] : importance of diet and exercise to improve glycemic control, achieve weight loss and improve cardiovascular health [Exercise/Effect on Glucose] : exercise/effect on glucose [Hypoglycemia Management] : hypoglycemia management [Glucagon Use] : glucagon use [Action and use of Insulin] : action and use of short and long-acting insulin [Self Monitoring of Blood Glucose] : self monitoring of blood glucose [Injection Technique, Storage, Sharps Disposal] : injection technique, storage, and sharps disposal [Retinopathy Screening] : Patient was referred to ophthalmology for retinopathy screening

## 2024-08-14 ENCOUNTER — APPOINTMENT (OUTPATIENT)
Dept: SURGERY | Facility: CLINIC | Age: 74
End: 2024-08-14
Payer: MEDICARE

## 2024-08-14 DIAGNOSIS — E06.3 AUTOIMMUNE THYROIDITIS: ICD-10-CM

## 2024-08-14 DIAGNOSIS — E04.2 NONTOXIC MULTINODULAR GOITER: ICD-10-CM

## 2024-08-14 DIAGNOSIS — E05.90 THYROTOXICOSIS, UNSPECIFIED W/OUT THYROTOXIC CRISIS OR STORM: ICD-10-CM

## 2024-08-14 DIAGNOSIS — Z86.39 PERSONAL HISTORY OF OTHER ENDOCRINE, NUTRITIONAL AND METABOLIC DISEASE: ICD-10-CM

## 2024-08-14 PROCEDURE — 99214 OFFICE O/P EST MOD 30 MIN: CPT

## 2024-08-14 NOTE — HISTORY OF PRESENT ILLNESS
[de-identified] : Mrs. Montalvo presents for follow-up.  Labs performed 07/10/2024 revealed a markedly elevated TPO Ab = 1822, TG Ab = < 20, serum calcium = 9.4, iPTH = 40, 25-OH vitamin D = 46.7, and normal coags.  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

## 2024-08-14 NOTE — ASSESSMENT
[FreeTextEntry1] : Assessment: 74-year-old woman, with history significant for hyperthyroidism and Hashimoto's thyroiditis, with reportedly stable bilateral thyroid nodules including a presumably low-risk indeterminate right thyroid nodule with toxic potential and an intermediate-risk indeterminate left thyroid nodule.  Plan: - All interval labs were reviewed with Mrs. Montalvo and her .  Management options of her thyroid nodules were then discussed including continued observation/active surveillance versus undergoing an elective janeen versus total thyroidectomy.   The risks associated with active surveillance were reviewed.  In addition, the increased surgical risks associated with severe Hashimoto's was discussed. - I then explained that if she were to opt to undergo either continued observation or a partial thyroidectomy, she would need to first undergo further imaging (viz., a repeat neck ultrasound and thyroid uptake scan) followed by FNA biopsies of any indicated nodules that have yet to be biopsied.  - The patient expressed understanding of the above and opted to first have a repeat neck ultrasound and thyroid uptake scan.  Will additionally arrange for a CT scan of the neck to evaluate for intrathoracic extension in light of today's in-office ultrasound findings. - She was instructed to follow-up once the above imaging is complete to review all results and further management.

## 2024-08-14 NOTE — PHYSICAL EXAM
[Midline] : located in midline position [Normal] : orientation to person, place, and time: normal [de-identified] : The neck appears flat.  There are no palpable masses appreciated.  In-office ultrasound was limited secondary to the patient's intolerance to fully extending the neck, however revealed a mildly heterogeneous thyroid with several bilateral heterogeneous and solid thyroid nodules including a right thyroid nodule with calcification and shadowing.  The inferior aspects of each lobe likely extend just inferior to the clavicle, however are able to be appreciated by aiming the probe down. [de-identified] : Extremities: ANN x 4.   Skin: No obvious skin lesions.   Voice: clear

## 2024-08-26 ENCOUNTER — APPOINTMENT (OUTPATIENT)
Dept: NUCLEAR MEDICINE | Facility: HOSPITAL | Age: 74
End: 2024-08-26
Payer: MEDICARE

## 2024-08-26 ENCOUNTER — OUTPATIENT (OUTPATIENT)
Dept: OUTPATIENT SERVICES | Facility: HOSPITAL | Age: 74
LOS: 1 days | End: 2024-08-26
Payer: MEDICARE

## 2024-08-26 ENCOUNTER — NON-APPOINTMENT (OUTPATIENT)
Age: 74
End: 2024-08-26

## 2024-08-26 ENCOUNTER — RESULT REVIEW (OUTPATIENT)
Age: 74
End: 2024-08-26

## 2024-08-26 DIAGNOSIS — E05.90 THYROTOXICOSIS, UNSPECIFIED WITHOUT THYROTOXIC CRISIS OR STORM: ICD-10-CM

## 2024-08-27 ENCOUNTER — APPOINTMENT (OUTPATIENT)
Dept: NUCLEAR MEDICINE | Facility: HOSPITAL | Age: 74
End: 2024-08-27

## 2024-08-27 PROCEDURE — 78014 THYROID IMAGING W/BLOOD FLOW: CPT

## 2024-08-27 PROCEDURE — A9516: CPT

## 2024-08-27 PROCEDURE — 78014 THYROID IMAGING W/BLOOD FLOW: CPT | Mod: 26

## 2024-09-09 ENCOUNTER — APPOINTMENT (OUTPATIENT)
Dept: ULTRASOUND IMAGING | Facility: CLINIC | Age: 74
End: 2024-09-09
Payer: MEDICARE

## 2024-09-09 ENCOUNTER — OUTPATIENT (OUTPATIENT)
Dept: OUTPATIENT SERVICES | Facility: HOSPITAL | Age: 74
LOS: 1 days | End: 2024-09-09
Payer: MEDICARE

## 2024-09-09 ENCOUNTER — NON-APPOINTMENT (OUTPATIENT)
Age: 74
End: 2024-09-09

## 2024-09-09 ENCOUNTER — APPOINTMENT (OUTPATIENT)
Dept: CT IMAGING | Facility: CLINIC | Age: 74
End: 2024-09-09
Payer: MEDICARE

## 2024-09-09 DIAGNOSIS — E04.2 NONTOXIC MULTINODULAR GOITER: ICD-10-CM

## 2024-09-09 PROCEDURE — 76536 US EXAM OF HEAD AND NECK: CPT | Mod: 26

## 2024-09-09 PROCEDURE — 70491 CT SOFT TISSUE NECK W/DYE: CPT | Mod: 26

## 2024-09-09 PROCEDURE — 76536 US EXAM OF HEAD AND NECK: CPT

## 2024-09-09 PROCEDURE — 70491 CT SOFT TISSUE NECK W/DYE: CPT

## 2024-09-10 ENCOUNTER — APPOINTMENT (OUTPATIENT)
Dept: ORTHOPEDIC SURGERY | Facility: CLINIC | Age: 74
End: 2024-09-10
Payer: MEDICARE

## 2024-09-10 DIAGNOSIS — D16.9 BENIGN NEOPLASM OF BONE AND ARTICULAR CARTILAGE, UNSPECIFIED: ICD-10-CM

## 2024-09-10 PROCEDURE — 99214 OFFICE O/P EST MOD 30 MIN: CPT

## 2024-09-13 PROBLEM — D16.9 OSTEOBLASTOMA: Status: ACTIVE | Noted: 2024-05-24

## 2024-09-15 ENCOUNTER — NON-APPOINTMENT (OUTPATIENT)
Age: 74
End: 2024-09-15

## 2024-09-25 ENCOUNTER — APPOINTMENT (OUTPATIENT)
Dept: SURGERY | Facility: CLINIC | Age: 74
End: 2024-09-25
Payer: MEDICARE

## 2024-09-25 DIAGNOSIS — E04.2 NONTOXIC MULTINODULAR GOITER: ICD-10-CM

## 2024-09-25 DIAGNOSIS — E05.90 THYROTOXICOSIS, UNSPECIFIED W/OUT THYROTOXIC CRISIS OR STORM: ICD-10-CM

## 2024-09-25 DIAGNOSIS — E06.3 AUTOIMMUNE THYROIDITIS: ICD-10-CM

## 2024-09-25 DIAGNOSIS — E05.20 THYROTOXICOSIS WITH TOXIC MULTINODULAR GOITER W/OUT THYROTOXIC CRISIS OR STORM: ICD-10-CM

## 2024-09-25 PROCEDURE — 99443: CPT

## 2024-09-25 NOTE — ASSESSMENT
[FreeTextEntry1] : Assessment: 74-year-old woman, with history significant for hyperthyroidism and Hashimoto's thyroiditis, with bilateral toxic thyroid nodules, a presumably low-risk indeterminate right thyroid nodule, and a small intermediate-risk indeterminate left thyroid nodule.  Plan: - All interval imaging was reviewed with Mrs. Montalvo.  The indications for biopsy were then discussed and I explained that a biopsy is recommended for hypoechoic solid thyroid nodules > 1 cm in diameter, isoechoic and complex thyroid nodules > 1.5 cm in diameter, and particularly of nodules that contain suspicious features (i.e., calcifications).  Based upon this criteria, I explained that four of her reported thyroid nodules meet YOUSIF criteria for a biopsy and thar three of these nodules have not been biopsied.  - Management options of her thyroid nodules were then discussed including undergoing FNA biopsies of the three other nodules that meet YOUSIF criteria followed by continued observation/active surveillance and/or UHRTADO versus undergoing an elective thyroidectomy.  Considering that she has bilateral toxic nodules, I explained that surgical management should entail an elective total thyroidectomy.  The risks associated with active surveillance and well as the increased surgical risks associated with severe Hashimoto's were reviewed. - The patient expressed understanding of the above and stated that she wanted to think things over.  She agreed to contact the office to either schedule for surgery or to arrange for additional FNA biopsies.

## 2024-09-25 NOTE — HISTORY OF PRESENT ILLNESS
[de-identified] : Mrs. Montalvo presents for follow-up.  Neck ultrasound, performed 09/09/2024 ([facility]), reported a right mid 1.2 cm calcified nodule, a right mid to lower 2.1 cm heterogeneous thyroid nodule, a right lower 2.2 cm heterogeneous thyroid nodule, a left upper 2.1 cm heterogeneous thyroid nodule, and a left lower 1.3 cm isoechoic solid thyroid nodule. There was no lymphadenopathy appreciated.  Direct comparison to her prior ultrasound at [facility] is difficult due to differences in measurement and reporting, however her bilateral indeterminate nodules have likely remained relatively stable in size.  Thyroid uptake scan, performed 08/27/2024 ([facility]), revealed areas of increased uptake in the right lower lobe and left upper lobe.  There was an area of decreased uptake in the mid left thyroid lobe.   CT scan of the neck, performed 09/09/2024 ([facility]), did not reveal substernal extension.

## 2024-10-24 ENCOUNTER — APPOINTMENT (OUTPATIENT)
Dept: ENDOCRINOLOGY | Facility: CLINIC | Age: 74
End: 2024-10-24

## 2024-11-04 ENCOUNTER — APPOINTMENT (OUTPATIENT)
Dept: ORTHOPEDIC SURGERY | Facility: CLINIC | Age: 74
End: 2024-11-04
Payer: MEDICARE

## 2024-11-04 DIAGNOSIS — G56.02 CARPAL TUNNEL SYNDROME, LEFT UPPER LIMB: ICD-10-CM

## 2024-11-04 PROCEDURE — 99203 OFFICE O/P NEW LOW 30 MIN: CPT

## 2024-11-26 ENCOUNTER — APPOINTMENT (OUTPATIENT)
Dept: ENDOCRINOLOGY | Facility: CLINIC | Age: 74
End: 2024-11-26
Payer: MEDICARE

## 2024-11-26 VITALS — SYSTOLIC BLOOD PRESSURE: 138 MMHG | DIASTOLIC BLOOD PRESSURE: 72 MMHG

## 2024-11-26 VITALS
SYSTOLIC BLOOD PRESSURE: 146 MMHG | BODY MASS INDEX: 29.82 KG/M2 | OXYGEN SATURATION: 98 % | DIASTOLIC BLOOD PRESSURE: 75 MMHG | HEART RATE: 72 BPM | RESPIRATION RATE: 16 BRPM | WEIGHT: 190 LBS | HEIGHT: 67 IN | TEMPERATURE: 97.4 F

## 2024-11-26 DIAGNOSIS — I10 ESSENTIAL (PRIMARY) HYPERTENSION: ICD-10-CM

## 2024-11-26 DIAGNOSIS — E11.9 TYPE 2 DIABETES MELLITUS W/OUT COMPLICATIONS: ICD-10-CM

## 2024-11-26 DIAGNOSIS — E05.90 THYROTOXICOSIS, UNSPECIFIED W/OUT THYROTOXIC CRISIS OR STORM: ICD-10-CM

## 2024-11-26 DIAGNOSIS — E78.5 HYPERLIPIDEMIA, UNSPECIFIED: ICD-10-CM

## 2024-11-26 DIAGNOSIS — M85.80 OTHER SPECIFIED DISORDERS OF BONE DENSITY AND STRUCTURE, UNSPECIFIED SITE: ICD-10-CM

## 2024-11-26 DIAGNOSIS — E05.20 THYROTOXICOSIS WITH TOXIC MULTINODULAR GOITER W/OUT THYROTOXIC CRISIS OR STORM: ICD-10-CM

## 2024-11-26 PROCEDURE — G2211 COMPLEX E/M VISIT ADD ON: CPT

## 2024-11-26 PROCEDURE — 99214 OFFICE O/P EST MOD 30 MIN: CPT

## 2024-11-26 RX ORDER — ESCITALOPRAM OXALATE 5 MG/1
5 TABLET ORAL
Qty: 90 | Refills: 2 | Status: ACTIVE | COMMUNITY
Start: 2024-11-26 | End: 1900-01-01

## 2024-12-02 ENCOUNTER — APPOINTMENT (OUTPATIENT)
Dept: ORTHOPEDIC SURGERY | Facility: CLINIC | Age: 74
End: 2024-12-02
Payer: MEDICARE

## 2024-12-02 DIAGNOSIS — G56.02 CARPAL TUNNEL SYNDROME, LEFT UPPER LIMB: ICD-10-CM

## 2024-12-02 PROCEDURE — 99213 OFFICE O/P EST LOW 20 MIN: CPT

## 2024-12-09 ENCOUNTER — TRANSCRIPTION ENCOUNTER (OUTPATIENT)
Age: 74
End: 2024-12-09

## 2025-02-26 ENCOUNTER — RX RENEWAL (OUTPATIENT)
Age: 75
End: 2025-02-26

## 2025-03-20 LAB
25(OH)D3 SERPL-MCNC: 42.4 NG/ML
ALBUMIN SERPL ELPH-MCNC: 4.2 G/DL
ALP BLD-CCNC: 71 U/L
ALT SERPL-CCNC: 27 U/L
ANION GAP SERPL CALC-SCNC: 11 MMOL/L
AST SERPL-CCNC: 24 U/L
BASOPHILS # BLD AUTO: 0.04 K/UL
BASOPHILS NFR BLD AUTO: 0.8 %
BILIRUB SERPL-MCNC: 0.4 MG/DL
BUN SERPL-MCNC: 22 MG/DL
CALCIUM SERPL-MCNC: 9.3 MG/DL
CHLORIDE SERPL-SCNC: 105 MMOL/L
CHOLEST SERPL-MCNC: 218 MG/DL
CO2 SERPL-SCNC: 24 MMOL/L
CREAT SERPL-MCNC: 0.88 MG/DL
CREAT SPEC-SCNC: 87 MG/DL
EGFRCR SERPLBLD CKD-EPI 2021: 68 ML/MIN/1.73M2
EOSINOPHIL # BLD AUTO: 0.2 K/UL
EOSINOPHIL NFR BLD AUTO: 3.9 %
ESTIMATED AVERAGE GLUCOSE: 134 MG/DL
FOLATE SERPL-MCNC: 17.7 NG/ML
FRUCTOSAMINE SERPL-MCNC: 246 UMOL/L
GLUCOSE SERPL-MCNC: 112 MG/DL
HBA1C MFR BLD HPLC: 6.3 %
HCT VFR BLD CALC: 37.7 %
HDLC SERPL-MCNC: 77 MG/DL
HGB BLD-MCNC: 12.2 G/DL
IMM GRANULOCYTES NFR BLD AUTO: 0.2 %
LDLC SERPL CALC-MCNC: 128 MG/DL
LYMPHOCYTES # BLD AUTO: 1.84 K/UL
LYMPHOCYTES NFR BLD AUTO: 35.9 %
MAN DIFF?: NORMAL
MCHC RBC-ENTMCNC: 30 PG
MCHC RBC-ENTMCNC: 32.4 G/DL
MCV RBC AUTO: 92.9 FL
MICROALBUMIN 24H UR DL<=1MG/L-MCNC: 1.3 MG/DL
MICROALBUMIN/CREAT 24H UR-RTO: 15 MG/G
MONOCYTES # BLD AUTO: 0.43 K/UL
MONOCYTES NFR BLD AUTO: 8.4 %
NEUTROPHILS # BLD AUTO: 2.61 K/UL
NEUTROPHILS NFR BLD AUTO: 50.8 %
NONHDLC SERPL-MCNC: 141 MG/DL
PLATELET # BLD AUTO: 212 K/UL
POTASSIUM SERPL-SCNC: 4.5 MMOL/L
POTASSIUM SERPL-SCNC: 4.7 MMOL/L
PROT SERPL-MCNC: 6.5 G/DL
RBC # BLD: 4.06 M/UL
RBC # FLD: 14.6 %
SODIUM SERPL-SCNC: 141 MMOL/L
T3 SERPL-MCNC: 120 NG/DL
T4 FREE SERPL-MCNC: 0.9 NG/DL
TRIGL SERPL-MCNC: 73 MG/DL
TSH SERPL-ACNC: 5.02 UIU/ML
VIT B12 SERPL-MCNC: 531 PG/ML
WBC # FLD AUTO: 5.13 K/UL

## 2025-03-25 ENCOUNTER — APPOINTMENT (OUTPATIENT)
Dept: ENDOCRINOLOGY | Facility: CLINIC | Age: 75
End: 2025-03-25
Payer: MEDICARE

## 2025-03-25 VITALS
HEIGHT: 67 IN | SYSTOLIC BLOOD PRESSURE: 150 MMHG | RESPIRATION RATE: 16 BRPM | OXYGEN SATURATION: 95 % | DIASTOLIC BLOOD PRESSURE: 91 MMHG | BODY MASS INDEX: 30.76 KG/M2 | WEIGHT: 196 LBS | HEART RATE: 89 BPM

## 2025-03-25 DIAGNOSIS — E05.90 THYROTOXICOSIS, UNSPECIFIED W/OUT THYROTOXIC CRISIS OR STORM: ICD-10-CM

## 2025-03-25 DIAGNOSIS — E78.5 HYPERLIPIDEMIA, UNSPECIFIED: ICD-10-CM

## 2025-03-25 DIAGNOSIS — E06.3 AUTOIMMUNE THYROIDITIS: ICD-10-CM

## 2025-03-25 DIAGNOSIS — M85.80 OTHER SPECIFIED DISORDERS OF BONE DENSITY AND STRUCTURE, UNSPECIFIED SITE: ICD-10-CM

## 2025-03-25 DIAGNOSIS — E11.9 TYPE 2 DIABETES MELLITUS W/OUT COMPLICATIONS: ICD-10-CM

## 2025-03-25 DIAGNOSIS — E04.2 NONTOXIC MULTINODULAR GOITER: ICD-10-CM

## 2025-03-25 DIAGNOSIS — I10 ESSENTIAL (PRIMARY) HYPERTENSION: ICD-10-CM

## 2025-03-25 LAB — GLUCOSE BLDC GLUCOMTR-MCNC: 115

## 2025-03-25 PROCEDURE — 99214 OFFICE O/P EST MOD 30 MIN: CPT

## 2025-03-25 PROCEDURE — G2211 COMPLEX E/M VISIT ADD ON: CPT

## 2025-03-27 RX ORDER — TIRZEPATIDE 2.5 MG/.5ML
2.5 INJECTION, SOLUTION SUBCUTANEOUS
Qty: 1 | Refills: 6 | Status: ACTIVE | COMMUNITY
Start: 2025-03-25

## 2025-04-03 ENCOUNTER — APPOINTMENT (OUTPATIENT)
Dept: ENDOCRINOLOGY | Facility: CLINIC | Age: 75
End: 2025-04-03

## 2025-04-17 ENCOUNTER — TRANSCRIPTION ENCOUNTER (OUTPATIENT)
Age: 75
End: 2025-04-17

## 2025-04-26 ENCOUNTER — NON-APPOINTMENT (OUTPATIENT)
Age: 75
End: 2025-04-26

## 2025-05-15 ENCOUNTER — RX RENEWAL (OUTPATIENT)
Age: 75
End: 2025-05-15

## 2025-06-19 ENCOUNTER — APPOINTMENT (OUTPATIENT)
Dept: INTERNAL MEDICINE | Facility: CLINIC | Age: 75
End: 2025-06-19
Payer: MEDICARE

## 2025-06-19 VITALS
BODY MASS INDEX: 30.13 KG/M2 | WEIGHT: 192 LBS | OXYGEN SATURATION: 97 % | HEART RATE: 78 BPM | DIASTOLIC BLOOD PRESSURE: 91 MMHG | HEIGHT: 67 IN | SYSTOLIC BLOOD PRESSURE: 148 MMHG

## 2025-06-19 PROBLEM — Z12.11 SCREEN FOR COLON CANCER: Status: ACTIVE | Noted: 2025-06-19

## 2025-06-19 PROBLEM — Z78.9 DOES NOT USE ILLICIT DRUGS: Status: ACTIVE | Noted: 2025-06-19

## 2025-06-19 PROBLEM — Z80.0 FAMILY HISTORY OF PANCREATIC CANCER: Status: ACTIVE | Noted: 2025-06-19

## 2025-06-19 PROBLEM — Z12.31 OTHER SCREENING MAMMOGRAM: Status: ACTIVE | Noted: 2025-06-19

## 2025-06-19 PROBLEM — Z80.41 FAMILY HISTORY OF MALIGNANT NEOPLASM OF OVARY: Status: ACTIVE | Noted: 2025-06-19

## 2025-06-19 PROBLEM — R73.03 PREDIABETES: Status: ACTIVE | Noted: 2025-06-19

## 2025-06-19 PROBLEM — R09.82 POSTNASAL DRIP: Status: ACTIVE | Noted: 2025-06-19

## 2025-06-19 PROCEDURE — 93000 ELECTROCARDIOGRAM COMPLETE: CPT

## 2025-06-19 PROCEDURE — G0439: CPT | Mod: 25

## 2025-06-19 RX ORDER — FLUTICASONE PROPIONATE 50 UG/1
50 SPRAY NASAL DAILY
Qty: 1 | Refills: 1 | Status: ACTIVE | COMMUNITY
Start: 2025-06-19 | End: 1900-01-01

## 2025-07-08 ENCOUNTER — APPOINTMENT (OUTPATIENT)
Dept: ENDOCRINOLOGY | Facility: CLINIC | Age: 75
End: 2025-07-08
Payer: MEDICARE

## 2025-07-08 VITALS
HEART RATE: 77 BPM | HEIGHT: 67 IN | OXYGEN SATURATION: 95 % | RESPIRATION RATE: 16 BRPM | SYSTOLIC BLOOD PRESSURE: 128 MMHG | WEIGHT: 186 LBS | BODY MASS INDEX: 29.19 KG/M2 | DIASTOLIC BLOOD PRESSURE: 68 MMHG

## 2025-07-08 LAB — GLUCOSE BLDC GLUCOMTR-MCNC: 112

## 2025-07-08 PROCEDURE — 99214 OFFICE O/P EST MOD 30 MIN: CPT | Mod: 25

## 2025-07-08 PROCEDURE — 82962 GLUCOSE BLOOD TEST: CPT

## 2025-08-05 ENCOUNTER — NON-APPOINTMENT (OUTPATIENT)
Age: 75
End: 2025-08-05

## 2025-08-07 DIAGNOSIS — K82.4 CHOLESTEROLOSIS OF GALLBLADDER: ICD-10-CM

## 2025-08-14 ENCOUNTER — RX RENEWAL (OUTPATIENT)
Age: 75
End: 2025-08-14

## 2025-08-18 ENCOUNTER — NON-APPOINTMENT (OUTPATIENT)
Age: 75
End: 2025-08-18

## 2025-08-19 ENCOUNTER — RX RENEWAL (OUTPATIENT)
Age: 75
End: 2025-08-19

## 2025-08-29 ENCOUNTER — TRANSCRIPTION ENCOUNTER (OUTPATIENT)
Age: 75
End: 2025-08-29

## 2025-09-05 ENCOUNTER — TRANSCRIPTION ENCOUNTER (OUTPATIENT)
Age: 75
End: 2025-09-05

## 2025-09-12 ENCOUNTER — APPOINTMENT (OUTPATIENT)
Facility: CLINIC | Age: 75
End: 2025-09-12
Payer: MEDICARE

## 2025-09-12 VITALS
HEIGHT: 67 IN | SYSTOLIC BLOOD PRESSURE: 138 MMHG | BODY MASS INDEX: 29.35 KG/M2 | WEIGHT: 187 LBS | HEART RATE: 69 BPM | DIASTOLIC BLOOD PRESSURE: 70 MMHG | OXYGEN SATURATION: 98 %

## 2025-09-12 PROCEDURE — G2211 COMPLEX E/M VISIT ADD ON: CPT

## 2025-09-12 PROCEDURE — 99203 OFFICE O/P NEW LOW 30 MIN: CPT

## 2025-09-12 RX ORDER — SODIUM SULFATE, MAGNESIUM SULFATE, AND POTASSIUM CHLORIDE 17.75; 2.7; 2.25 G/1; G/1; G/1
1479-225-188 TABLET ORAL
Qty: 24 | Refills: 0 | Status: ACTIVE | COMMUNITY
Start: 2025-09-12 | End: 1900-01-01